# Patient Record
Sex: MALE | Race: ASIAN | NOT HISPANIC OR LATINO
[De-identification: names, ages, dates, MRNs, and addresses within clinical notes are randomized per-mention and may not be internally consistent; named-entity substitution may affect disease eponyms.]

---

## 2019-04-29 PROBLEM — Z00.00 ENCOUNTER FOR PREVENTIVE HEALTH EXAMINATION: Status: ACTIVE | Noted: 2019-04-29

## 2019-04-30 ENCOUNTER — APPOINTMENT (OUTPATIENT)
Dept: THORACIC SURGERY | Facility: CLINIC | Age: 59
End: 2019-04-30
Payer: MEDICAID

## 2019-04-30 VITALS
HEART RATE: 87 BPM | RESPIRATION RATE: 18 BRPM | TEMPERATURE: 97.5 F | BODY MASS INDEX: 26.63 KG/M2 | OXYGEN SATURATION: 97 % | DIASTOLIC BLOOD PRESSURE: 69 MMHG | HEIGHT: 64 IN | WEIGHT: 156 LBS | SYSTOLIC BLOOD PRESSURE: 119 MMHG

## 2019-04-30 DIAGNOSIS — Z87.09 PERSONAL HISTORY OF OTHER DISEASES OF THE RESPIRATORY SYSTEM: ICD-10-CM

## 2019-04-30 DIAGNOSIS — Z87.891 PERSONAL HISTORY OF NICOTINE DEPENDENCE: ICD-10-CM

## 2019-04-30 PROCEDURE — 99205 OFFICE O/P NEW HI 60 MIN: CPT

## 2019-05-01 PROBLEM — Z87.09 HISTORY OF ASTHMA: Status: RESOLVED | Noted: 2019-05-01 | Resolved: 2019-05-01

## 2019-05-01 PROBLEM — Z87.891 FORMER SMOKER: Status: ACTIVE | Noted: 2019-05-01

## 2019-05-01 PROBLEM — Z87.09 HISTORY OF CHRONIC OBSTRUCTIVE LUNG DISEASE: Status: RESOLVED | Noted: 2019-05-01 | Resolved: 2019-05-01

## 2019-05-02 NOTE — DATA REVIEWED
[FreeTextEntry1] : CT Chest on 3/11/19 (compared to 1/2018 and 3/2015):\par - a 1.7cm ill-defined RUL ggo, stable from 2018 but increased from 6mm from 2015\par - a stable 5mm LLL nodule (series 3 image 94)\par - bronchiectasis \par - subpleural atelectasis and scarring in the posterior RUL\par \par FNA of RUL nodule on 4/22/19 at [facility]/. Path revealed AdenoCA. +TTF-1 and cytokeratin; Ki-67 5%.\par \par PFTs on 4/29/19: FVC 94%, FEV1 44%, DLCO 101%.

## 2019-05-02 NOTE — ASSESSMENT
[FreeTextEntry1] : 57 y/o M, former smoker, w/ hx of asthma, COPD, lung nodule followed by Dr. Vázquez since 2015 and has found increased in size. \par \par CT Chest on 3/11/19 (compared to 1/2018 and 3/2015):\par - a 1.7cm ill-defined RUL ggo, stable from 2018 but increased from 6mm from 2015\par - a stable 5mm LLL nodule (series 3 image 94)\par - bronchiectasis \par - subpleural atelectasis and scarring in the posterior RUL\par \par FNA of RUL nodule on 4/22/19 at [facility]/. Path revealed AdenoCA. +TTF-1 and cytokeratin; Ki-67 5%.\par \par PFTs on 4/29/19: FVC 94%, FEV1 44%, DLCO 101%.\par \par I have reviewed the patient's medical records and diagnostic images at time of this office consultation and have made the following recommendation:\par 1. CT and FNA finding reviewed with pt and family. RUL biopsy proven AdenoCA, I recommended Rt VATS, robotic-assisted, RULobectomy, MLND on 6/5/19. Risks and benefits and alternatives explained to patient, all questions answered, patient agreed to proceed with surgery.\par 2. Medical clearance and PST\par 3. PET/CT and MRI of brain, ordered by Dr. Vázquez \par \par Written by Norma Elliott NP, acting as a scribe for Dr. Louie Mata. \par \par The documentation recorded by the scribe accurately reflects the service I personally performed and the decisions made by me. LOUIE MATA MD\par

## 2019-05-02 NOTE — PHYSICAL EXAM
[Oropharynx] : the oropharynx was normal [Outer Ear] : the ears and nose were normal in appearance [Neck Appearance] : the appearance of the neck was normal [Jugular Venous Distention Increased] : there was no jugular-venous distention [Neck Cervical Mass (___cm)] : no neck mass was observed [Thyroid Nodule] : there were no palpable thyroid nodules [Thyroid Diffuse Enlargement] : the thyroid was not enlarged [Heart Rate And Rhythm] : heart rate was normal and rhythm regular [Heart Sounds] : normal S1 and S2 [Heart Sounds Gallop] : no gallops [Murmurs] : no murmurs [Heart Sounds Pericardial Friction Rub] : no pericardial rub [Examination Of The Chest] : the chest was normal in appearance [Chest Visual Inspection Thoracic Asymmetry] : no chest asymmetry [Diminished Respiratory Excursion] : normal chest expansion [2+] : right 2+ [Breast Appearance] : normal in appearance [Breast Palpation Mass] : no palpable masses [Bowel Sounds] : normal bowel sounds [Abdomen Soft] : soft [Abdomen Tenderness] : non-tender [Abdomen Mass (___ Cm)] : no abdominal mass palpated [Cervical Lymph Nodes Enlarged Posterior Bilaterally] : posterior cervical [Cervical Lymph Nodes Enlarged Anterior Bilaterally] : anterior cervical [Supraclavicular Lymph Nodes Enlarged Bilaterally] : supraclavicular [No CVA Tenderness] : no ~M costovertebral angle tenderness [No Spinal Tenderness] : no spinal tenderness [Abnormal Walk] : normal gait [Nail Clubbing] : no clubbing  or cyanosis of the fingernails [Musculoskeletal - Swelling] : no joint swelling seen [Motor Tone] : muscle strength and tone were normal [Skin Color & Pigmentation] : normal skin color and pigmentation [] : no rash [Skin Turgor] : normal skin turgor [Deep Tendon Reflexes (DTR)] : deep tendon reflexes were 2+ and symmetric [Sensation] : the sensory exam was normal to light touch and pinprick [No Focal Deficits] : no focal deficits [Oriented To Time, Place, And Person] : oriented to person, place, and time [Impaired Insight] : insight and judgment were intact [Affect] : the affect was normal [General Appearance - Alert] : alert [General Appearance - In No Acute Distress] : in no acute distress [Sclera] : the sclera and conjunctiva were normal [PERRL With Normal Accommodation] : pupils were equal in size, round, and reactive to light [Extraocular Movements] : extraocular movements were intact [FreeTextEntry1] : deferred

## 2019-05-02 NOTE — CONSULT LETTER
[Dear  ___] : Dear  [unfilled], [Consult Letter:] : I had the pleasure of evaluating your patient, [unfilled]. [( Thank you for referring [unfilled] for consultation for _____ )] : Thank you for referring [unfilled] for consultation for [unfilled] [Please see my note below.] : Please see my note below. [Consult Closing:] : Thank you very much for allowing me to participate in the care of this patient.  If you have any questions, please do not hesitate to contact me. [Sincerely,] : Sincerely, [DrMarci  ___] : Dr. GARSIA [FreeTextEntry2] : Esteban Vázquez MD (Pul/Referring)\par Ashlyn Moore MD (PCP) [FreeTextEntry3] : Louie Ibarra MD, MPH \par System Director of Thoracic Surgery \par Director of Comprehensive Lung and Foregut [place] \par Professor Cardiovascular & Thoracic Surgery  \par [employer] [employer] at [facility]\par

## 2019-05-02 NOTE — HISTORY OF PRESENT ILLNESS
[FreeTextEntry1] : 59 y/o M, former smoker, w/ hx of asthma, COPD, lung nodule followed by Dr. Vázuqez since 2015 and has found increased in size. \par \par CT Chest on 3/11/19 (compared to 1/2018 and 3/2015):\par - a 1.7cm ill-defined RUL ggo, stable from 2018 but increased from 6mm from 2015\par - a stable 5mm LLL nodule (series 3 image 94)\par - bronchiectasis \par - subpleural atelectasis and scarring in the posterior RUL\par \par FNA of RUL nodule on 4/22/19 at [facility]/. Path revealed AdenoCA. +TTF-1 and cytokeratin; Ki-67 5%.\par \par PFTs on 4/29/19: FVC 94%, FEV1 44%, DLCO 101%.\par \par Patient is here today for CT Sx consultation, referred by Dr. Esteban Vázquez. Pt reports doing well, denies SOB, cough, hemoptysis, fever, chills, or CP.

## 2019-05-16 ENCOUNTER — RESULT REVIEW (OUTPATIENT)
Age: 59
End: 2019-05-16

## 2019-06-01 ENCOUNTER — OUTPATIENT (OUTPATIENT)
Dept: OUTPATIENT SERVICES | Facility: HOSPITAL | Age: 59
LOS: 1 days | End: 2019-06-01
Payer: MEDICAID

## 2019-06-01 PROCEDURE — G9001: CPT

## 2019-06-03 ENCOUNTER — OUTPATIENT (OUTPATIENT)
Dept: OUTPATIENT SERVICES | Facility: HOSPITAL | Age: 59
LOS: 1 days | End: 2019-06-03

## 2019-06-03 VITALS
HEIGHT: 64 IN | WEIGHT: 149.91 LBS | RESPIRATION RATE: 14 BRPM | OXYGEN SATURATION: 97 % | SYSTOLIC BLOOD PRESSURE: 108 MMHG | TEMPERATURE: 97 F | HEART RATE: 96 BPM | DIASTOLIC BLOOD PRESSURE: 78 MMHG

## 2019-06-03 DIAGNOSIS — C34.91 MALIGNANT NEOPLASM OF UNSPECIFIED PART OF RIGHT BRONCHUS OR LUNG: ICD-10-CM

## 2019-06-03 DIAGNOSIS — R06.83 SNORING: ICD-10-CM

## 2019-06-03 LAB
ALBUMIN SERPL ELPH-MCNC: 4.7 G/DL — SIGNIFICANT CHANGE UP (ref 3.3–5)
ALP SERPL-CCNC: 56 U/L — SIGNIFICANT CHANGE UP (ref 40–120)
ALT FLD-CCNC: 23 U/L — SIGNIFICANT CHANGE UP (ref 4–41)
ANION GAP SERPL CALC-SCNC: 14 MMO/L — SIGNIFICANT CHANGE UP (ref 7–14)
AST SERPL-CCNC: 16 U/L — SIGNIFICANT CHANGE UP (ref 4–40)
BILIRUB SERPL-MCNC: 0.6 MG/DL — SIGNIFICANT CHANGE UP (ref 0.2–1.2)
BLD GP AB SCN SERPL QL: NEGATIVE — SIGNIFICANT CHANGE UP
BUN SERPL-MCNC: 14 MG/DL — SIGNIFICANT CHANGE UP (ref 7–23)
CALCIUM SERPL-MCNC: 9.7 MG/DL — SIGNIFICANT CHANGE UP (ref 8.4–10.5)
CHLORIDE SERPL-SCNC: 102 MMOL/L — SIGNIFICANT CHANGE UP (ref 98–107)
CO2 SERPL-SCNC: 25 MMOL/L — SIGNIFICANT CHANGE UP (ref 22–31)
CREAT SERPL-MCNC: 0.84 MG/DL — SIGNIFICANT CHANGE UP (ref 0.5–1.3)
GLUCOSE SERPL-MCNC: 77 MG/DL — SIGNIFICANT CHANGE UP (ref 70–99)
HCT VFR BLD CALC: 47.1 % — SIGNIFICANT CHANGE UP (ref 39–50)
HGB BLD-MCNC: 15.3 G/DL — SIGNIFICANT CHANGE UP (ref 13–17)
MCHC RBC-ENTMCNC: 28.6 PG — SIGNIFICANT CHANGE UP (ref 27–34)
MCHC RBC-ENTMCNC: 32.5 % — SIGNIFICANT CHANGE UP (ref 32–36)
MCV RBC AUTO: 88 FL — SIGNIFICANT CHANGE UP (ref 80–100)
NRBC # FLD: 0 K/UL — SIGNIFICANT CHANGE UP (ref 0–0)
PLATELET # BLD AUTO: 291 K/UL — SIGNIFICANT CHANGE UP (ref 150–400)
PMV BLD: 10.5 FL — SIGNIFICANT CHANGE UP (ref 7–13)
POTASSIUM SERPL-MCNC: 3.6 MMOL/L — SIGNIFICANT CHANGE UP (ref 3.5–5.3)
POTASSIUM SERPL-SCNC: 3.6 MMOL/L — SIGNIFICANT CHANGE UP (ref 3.5–5.3)
PROT SERPL-MCNC: 7.6 G/DL — SIGNIFICANT CHANGE UP (ref 6–8.3)
RBC # BLD: 5.35 M/UL — SIGNIFICANT CHANGE UP (ref 4.2–5.8)
RBC # FLD: 12 % — SIGNIFICANT CHANGE UP (ref 10.3–14.5)
RH IG SCN BLD-IMP: POSITIVE — SIGNIFICANT CHANGE UP
SODIUM SERPL-SCNC: 141 MMOL/L — SIGNIFICANT CHANGE UP (ref 135–145)
WBC # BLD: 9.06 K/UL — SIGNIFICANT CHANGE UP (ref 3.8–10.5)
WBC # FLD AUTO: 9.06 K/UL — SIGNIFICANT CHANGE UP (ref 3.8–10.5)

## 2019-06-03 RX ORDER — SODIUM CHLORIDE 9 MG/ML
1000 INJECTION, SOLUTION INTRAVENOUS
Refills: 0 | Status: DISCONTINUED | OUTPATIENT
Start: 2019-06-10 | End: 2019-06-13

## 2019-06-03 NOTE — H&P PST ADULT - NEGATIVE OPHTHALMOLOGIC SYMPTOMS
no pain L/no irritation L/no discharge L/no pain R/no discharge R/no diplopia/no irritation R/no photophobia/no blurred vision L

## 2019-06-03 NOTE — H&P PST ADULT - HISTORY OF PRESENT ILLNESS
Patient is a 58 year old male with a history of Lung nodule followed by Dr. Vázquez since 2015. Patient is s/p CT of the chest on 3/11/2019 - with abnormal findings, and has found increased in size to the lung nodule. Patient was referred to Dr. Ibarra for an evaluation.  Pre op diagnosis; Malignant neoplasm of unspecified part of right bronchus or lung. Patient is scheduled for Right video assisted thoracoscopy, robotic assisted right upper lobectomy, mediastinal lymph node dissection scheduled on 6/10/2019.

## 2019-06-03 NOTE — H&P PST ADULT - MUSCULOSKELETAL
detailed exam ROM intact/no joint warmth/no joint erythema/normal strength/no joint swelling/no calf tenderness details…

## 2019-06-03 NOTE — H&P PST ADULT - NEGATIVE NEUROLOGICAL SYMPTOMS
no weakness/no generalized seizures/no vertigo/no difficulty walking/no tremors/no focal seizures/no headache/no paresthesias/no transient paralysis

## 2019-06-03 NOTE — H&P PST ADULT - NEGATIVE GENERAL GENITOURINARY SYMPTOMS
no flank pain L/no bladder infections/no hematuria/no dysuria/no urinary hesitancy/normal urinary frequency/no flank pain R/no urine discoloration/no incontinence

## 2019-06-03 NOTE — H&P PST ADULT - NSANTHOSAYNRD_GEN_A_CORE
No. BRIA screening performed.  STOP BANG Legend: 0-2 = LOW Risk; 3-4 = INTERMEDIATE Risk; 5-8 = HIGH Risk

## 2019-06-03 NOTE — H&P PST ADULT - RS GEN PE MLT RESP DETAILS PC
no rhonchi/respirations non-labored/airway patent/clear to auscultation bilaterally/no rales/no wheezes/breath sounds equal/good air movement

## 2019-06-03 NOTE — H&P PST ADULT - NEGATIVE ENMT SYMPTOMS
no nasal congestion/no hearing difficulty/no ear pain/no dry mouth/no vertigo/no sinus symptoms/no nasal obstruction/no post-nasal discharge/no tinnitus/no nose bleeds/no recurrent cold sores/no throat pain/no dysphagia

## 2019-06-03 NOTE — H&P PST ADULT - NEGATIVE ALLERGY TYPES
no reactions to insect bites/no indoor environmental allergies/no reactions to animals/no outdoor environmental allergies/no reactions to medicines/no reactions to food

## 2019-06-03 NOTE — H&P PST ADULT - NEGATIVE MUSCULOSKELETAL SYMPTOMS
no arthritis/no muscle cramps/no stiffness/no neck pain/no muscle weakness/no joint swelling/no arm pain L/no arm pain R/no leg pain R/no back pain/no leg pain L

## 2019-06-03 NOTE — H&P PST ADULT - NEGATIVE GASTROINTESTINAL SYMPTOMS
no change in bowel habits/no constipation/no abdominal pain/no vomiting/no diarrhea/no melena/no nausea

## 2019-06-03 NOTE — H&P PST ADULT - NSICDXPROBLEM_GEN_ALL_CORE_FT
PROBLEM DIAGNOSES  Problem: Malignant neoplasm of unspecified part of right bronchus or lung  Assessment and Plan:  Patient is scheduled for Right video assisted thoracoscopy, robotic assisted right upper lobectomy, mediastinal lymph node dissection scheduled on 6/10/2019.   Preop instructions, pepcid, surgical scrub provided. Pt stated understanding. Teach back method utilized.   Pending medical evaluation per surgeon and PST- Obtain for PST.   History of Asthma - Denies any medications, denies any recent hospitalization.       Problem: Snoring  Assessment and Plan: BRIA precautions, OR booking notified.

## 2019-06-03 NOTE — H&P PST ADULT - NSICDXPASTMEDICALHX_GEN_ALL_CORE_FT
PAST MEDICAL HISTORY:  Asthma Denies any recent hospitalization for Asthma    Fatty liver     Malignant neoplasm of unspecified part of right bronchus or lung

## 2019-06-03 NOTE — H&P PST ADULT - ASSESSMENT
Pre op diagnosis; Malignant neoplasm of unspecified part of right bronchus or lung. Patient is scheduled for Right video assisted thoracoscopy, robotic assisted right upper lobectomy, mediastinal lymph node dissection scheduled on 6/10/2019.

## 2019-06-07 RX ORDER — HEPARIN SODIUM 5000 [USP'U]/ML
5000 INJECTION INTRAVENOUS; SUBCUTANEOUS ONCE
Refills: 0 | Status: COMPLETED | OUTPATIENT
Start: 2019-06-10 | End: 2019-06-10

## 2019-06-10 ENCOUNTER — INPATIENT (INPATIENT)
Facility: HOSPITAL | Age: 59
LOS: 2 days | Discharge: HOME | End: 2019-06-13
Attending: THORACIC SURGERY (CARDIOTHORACIC VASCULAR SURGERY) | Admitting: THORACIC SURGERY (CARDIOTHORACIC VASCULAR SURGERY)
Payer: MEDICAID

## 2019-06-10 ENCOUNTER — RESULT REVIEW (OUTPATIENT)
Age: 59
End: 2019-06-10

## 2019-06-10 ENCOUNTER — APPOINTMENT (OUTPATIENT)
Dept: THORACIC SURGERY | Facility: HOSPITAL | Age: 59
End: 2019-06-10

## 2019-06-10 VITALS
RESPIRATION RATE: 20 BRPM | WEIGHT: 149.91 LBS | DIASTOLIC BLOOD PRESSURE: 71 MMHG | TEMPERATURE: 98 F | OXYGEN SATURATION: 95 % | SYSTOLIC BLOOD PRESSURE: 109 MMHG | HEART RATE: 84 BPM | HEIGHT: 64 IN

## 2019-06-10 DIAGNOSIS — C34.91 MALIGNANT NEOPLASM OF UNSPECIFIED PART OF RIGHT BRONCHUS OR LUNG: ICD-10-CM

## 2019-06-10 LAB — RH IG SCN BLD-IMP: POSITIVE — SIGNIFICANT CHANGE UP

## 2019-06-10 PROCEDURE — 32674 THORACOSCOPY LYMPH NODE EXC: CPT | Mod: AS

## 2019-06-10 PROCEDURE — 71045 X-RAY EXAM CHEST 1 VIEW: CPT | Mod: 26

## 2019-06-10 PROCEDURE — S2900 ROBOTIC SURGICAL SYSTEM: CPT | Mod: NC

## 2019-06-10 PROCEDURE — 88307 TISSUE EXAM BY PATHOLOGIST: CPT | Mod: 26

## 2019-06-10 PROCEDURE — 32674 THORACOSCOPY LYMPH NODE EXC: CPT

## 2019-06-10 PROCEDURE — 32663 THORACOSCOPY W/LOBECTOMY: CPT | Mod: 22

## 2019-06-10 PROCEDURE — 88305 TISSUE EXAM BY PATHOLOGIST: CPT | Mod: 26

## 2019-06-10 PROCEDURE — 99233 SBSQ HOSP IP/OBS HIGH 50: CPT

## 2019-06-10 PROCEDURE — 32663 THORACOSCOPY W/LOBECTOMY: CPT | Mod: AS

## 2019-06-10 RX ORDER — HYDROMORPHONE HYDROCHLORIDE 2 MG/ML
0.5 INJECTION INTRAMUSCULAR; INTRAVENOUS; SUBCUTANEOUS
Refills: 0 | Status: DISCONTINUED | OUTPATIENT
Start: 2019-06-10 | End: 2019-06-12

## 2019-06-10 RX ORDER — DEXMEDETOMIDINE HYDROCHLORIDE IN 0.9% SODIUM CHLORIDE 4 UG/ML
0.4 INJECTION INTRAVENOUS
Qty: 200 | Refills: 0 | Status: DISCONTINUED | OUTPATIENT
Start: 2019-06-10 | End: 2019-06-11

## 2019-06-10 RX ORDER — ACETAMINOPHEN 500 MG
1000 TABLET ORAL ONCE
Refills: 0 | Status: COMPLETED | OUTPATIENT
Start: 2019-06-10 | End: 2019-06-11

## 2019-06-10 RX ORDER — NALOXONE HYDROCHLORIDE 4 MG/.1ML
0.1 SPRAY NASAL
Refills: 0 | Status: DISCONTINUED | OUTPATIENT
Start: 2019-06-10 | End: 2019-06-12

## 2019-06-10 RX ORDER — ONDANSETRON 8 MG/1
4 TABLET, FILM COATED ORAL EVERY 6 HOURS
Refills: 0 | Status: DISCONTINUED | OUTPATIENT
Start: 2019-06-10 | End: 2019-06-12

## 2019-06-10 RX ORDER — SENNA PLUS 8.6 MG/1
2 TABLET ORAL AT BEDTIME
Refills: 0 | Status: DISCONTINUED | OUTPATIENT
Start: 2019-06-10 | End: 2019-06-13

## 2019-06-10 RX ORDER — HYDROMORPHONE HYDROCHLORIDE 2 MG/ML
30 INJECTION INTRAMUSCULAR; INTRAVENOUS; SUBCUTANEOUS
Refills: 0 | Status: DISCONTINUED | OUTPATIENT
Start: 2019-06-10 | End: 2019-06-12

## 2019-06-10 RX ORDER — DOCUSATE SODIUM 100 MG
100 CAPSULE ORAL THREE TIMES A DAY
Refills: 0 | Status: DISCONTINUED | OUTPATIENT
Start: 2019-06-10 | End: 2019-06-13

## 2019-06-10 RX ORDER — DIPHENHYDRAMINE HCL 50 MG
25 CAPSULE ORAL EVERY 4 HOURS
Refills: 0 | Status: DISCONTINUED | OUTPATIENT
Start: 2019-06-10 | End: 2019-06-12

## 2019-06-10 RX ADMIN — SODIUM CHLORIDE 30 MILLILITER(S): 9 INJECTION, SOLUTION INTRAVENOUS at 19:14

## 2019-06-10 RX ADMIN — HYDROMORPHONE HYDROCHLORIDE 30 MILLILITER(S): 2 INJECTION INTRAMUSCULAR; INTRAVENOUS; SUBCUTANEOUS at 19:15

## 2019-06-10 RX ADMIN — DEXMEDETOMIDINE HYDROCHLORIDE IN 0.9% SODIUM CHLORIDE 6.8 MICROGRAM(S)/KG/HR: 4 INJECTION INTRAVENOUS at 19:16

## 2019-06-10 RX ADMIN — HEPARIN SODIUM 5000 UNIT(S): 5000 INJECTION INTRAVENOUS; SUBCUTANEOUS at 13:43

## 2019-06-10 NOTE — BRIEF OPERATIVE NOTE - NSICDXBRIEFPROCEDURE_GEN_ALL_CORE_FT
PROCEDURES:  Robot-assisted thoracoscopic lobectomy of lung 10-Leo-2019 18:01:31 RULobectomy Cruz Slade

## 2019-06-10 NOTE — BRIEF OPERATIVE NOTE - ASSISTANT(S)
Nickolas Diaz PGY-7, Kun Slade PAC Keppra held for now. Will get EEG to see if there are any ongoing seizures. If there are any clear seizures then will restart.

## 2019-06-10 NOTE — ASU PATIENT PROFILE, ADULT - PMH
Asthma  Denies any recent hospitalization for Asthma  Fatty liver    Malignant neoplasm of unspecified part of right bronchus or lung

## 2019-06-11 DIAGNOSIS — Z71.89 OTHER SPECIFIED COUNSELING: ICD-10-CM

## 2019-06-11 LAB
ALBUMIN SERPL ELPH-MCNC: 4.3 G/DL — SIGNIFICANT CHANGE UP (ref 3.3–5)
ALP SERPL-CCNC: 48 U/L — SIGNIFICANT CHANGE UP (ref 40–120)
ALT FLD-CCNC: 30 U/L — SIGNIFICANT CHANGE UP (ref 4–41)
ANION GAP SERPL CALC-SCNC: 14 MMO/L — SIGNIFICANT CHANGE UP (ref 7–14)
ANION GAP SERPL CALC-SCNC: 14 MMO/L — SIGNIFICANT CHANGE UP (ref 7–14)
AST SERPL-CCNC: 25 U/L — SIGNIFICANT CHANGE UP (ref 4–40)
BASOPHILS # BLD AUTO: 0.05 K/UL — SIGNIFICANT CHANGE UP (ref 0–0.2)
BASOPHILS NFR BLD AUTO: 0.3 % — SIGNIFICANT CHANGE UP (ref 0–2)
BILIRUB SERPL-MCNC: 1.1 MG/DL — SIGNIFICANT CHANGE UP (ref 0.2–1.2)
BUN SERPL-MCNC: 24 MG/DL — HIGH (ref 7–23)
BUN SERPL-MCNC: 24 MG/DL — HIGH (ref 7–23)
CALCIUM SERPL-MCNC: 8.7 MG/DL — SIGNIFICANT CHANGE UP (ref 8.4–10.5)
CALCIUM SERPL-MCNC: 8.7 MG/DL — SIGNIFICANT CHANGE UP (ref 8.4–10.5)
CHLORIDE SERPL-SCNC: 104 MMOL/L — SIGNIFICANT CHANGE UP (ref 98–107)
CHLORIDE SERPL-SCNC: 104 MMOL/L — SIGNIFICANT CHANGE UP (ref 98–107)
CO2 SERPL-SCNC: 21 MMOL/L — LOW (ref 22–31)
CO2 SERPL-SCNC: 21 MMOL/L — LOW (ref 22–31)
CREAT SERPL-MCNC: 0.89 MG/DL — SIGNIFICANT CHANGE UP (ref 0.5–1.3)
CREAT SERPL-MCNC: 0.89 MG/DL — SIGNIFICANT CHANGE UP (ref 0.5–1.3)
EOSINOPHIL # BLD AUTO: 0.07 K/UL — SIGNIFICANT CHANGE UP (ref 0–0.5)
EOSINOPHIL NFR BLD AUTO: 0.4 % — SIGNIFICANT CHANGE UP (ref 0–6)
GLUCOSE SERPL-MCNC: 139 MG/DL — HIGH (ref 70–99)
GLUCOSE SERPL-MCNC: 139 MG/DL — HIGH (ref 70–99)
HCT VFR BLD CALC: 47.4 % — SIGNIFICANT CHANGE UP (ref 39–50)
HCT VFR BLD CALC: 47.4 % — SIGNIFICANT CHANGE UP (ref 39–50)
HGB BLD-MCNC: 15.6 G/DL — SIGNIFICANT CHANGE UP (ref 13–17)
HGB BLD-MCNC: 15.6 G/DL — SIGNIFICANT CHANGE UP (ref 13–17)
IMM GRANULOCYTES NFR BLD AUTO: 0.8 % — SIGNIFICANT CHANGE UP (ref 0–1.5)
LYMPHOCYTES # BLD AUTO: 1.09 K/UL — SIGNIFICANT CHANGE UP (ref 1–3.3)
LYMPHOCYTES # BLD AUTO: 5.5 % — LOW (ref 13–44)
MCHC RBC-ENTMCNC: 29.2 PG — SIGNIFICANT CHANGE UP (ref 27–34)
MCHC RBC-ENTMCNC: 29.2 PG — SIGNIFICANT CHANGE UP (ref 27–34)
MCHC RBC-ENTMCNC: 32.9 % — SIGNIFICANT CHANGE UP (ref 32–36)
MCHC RBC-ENTMCNC: 32.9 % — SIGNIFICANT CHANGE UP (ref 32–36)
MCV RBC AUTO: 88.6 FL — SIGNIFICANT CHANGE UP (ref 80–100)
MCV RBC AUTO: 88.6 FL — SIGNIFICANT CHANGE UP (ref 80–100)
MONOCYTES # BLD AUTO: 0.95 K/UL — HIGH (ref 0–0.9)
MONOCYTES NFR BLD AUTO: 4.8 % — SIGNIFICANT CHANGE UP (ref 2–14)
NEUTROPHILS # BLD AUTO: 17.57 K/UL — HIGH (ref 1.8–7.4)
NEUTROPHILS NFR BLD AUTO: 88.2 % — HIGH (ref 43–77)
NRBC # FLD: 0 K/UL — SIGNIFICANT CHANGE UP (ref 0–0)
NRBC # FLD: 0 K/UL — SIGNIFICANT CHANGE UP (ref 0–0)
PLATELET # BLD AUTO: 270 K/UL — SIGNIFICANT CHANGE UP (ref 150–400)
PLATELET # BLD AUTO: 270 K/UL — SIGNIFICANT CHANGE UP (ref 150–400)
PMV BLD: 10.2 FL — SIGNIFICANT CHANGE UP (ref 7–13)
PMV BLD: 10.2 FL — SIGNIFICANT CHANGE UP (ref 7–13)
POTASSIUM SERPL-MCNC: 4.6 MMOL/L — SIGNIFICANT CHANGE UP (ref 3.5–5.3)
POTASSIUM SERPL-MCNC: 4.6 MMOL/L — SIGNIFICANT CHANGE UP (ref 3.5–5.3)
POTASSIUM SERPL-SCNC: 4.6 MMOL/L — SIGNIFICANT CHANGE UP (ref 3.5–5.3)
POTASSIUM SERPL-SCNC: 4.6 MMOL/L — SIGNIFICANT CHANGE UP (ref 3.5–5.3)
PROT SERPL-MCNC: 7 G/DL — SIGNIFICANT CHANGE UP (ref 6–8.3)
RBC # BLD: 5.35 M/UL — SIGNIFICANT CHANGE UP (ref 4.2–5.8)
RBC # BLD: 5.35 M/UL — SIGNIFICANT CHANGE UP (ref 4.2–5.8)
RBC # FLD: 11.9 % — SIGNIFICANT CHANGE UP (ref 10.3–14.5)
RBC # FLD: 11.9 % — SIGNIFICANT CHANGE UP (ref 10.3–14.5)
SODIUM SERPL-SCNC: 139 MMOL/L — SIGNIFICANT CHANGE UP (ref 135–145)
SODIUM SERPL-SCNC: 139 MMOL/L — SIGNIFICANT CHANGE UP (ref 135–145)
WBC # BLD: 19.89 K/UL — HIGH (ref 3.8–10.5)
WBC # BLD: 19.89 K/UL — HIGH (ref 3.8–10.5)
WBC # FLD AUTO: 19.89 K/UL — HIGH (ref 3.8–10.5)
WBC # FLD AUTO: 19.89 K/UL — HIGH (ref 3.8–10.5)

## 2019-06-11 PROCEDURE — 99233 SBSQ HOSP IP/OBS HIGH 50: CPT

## 2019-06-11 PROCEDURE — 71045 X-RAY EXAM CHEST 1 VIEW: CPT | Mod: 26

## 2019-06-11 PROCEDURE — 99292 CRITICAL CARE ADDL 30 MIN: CPT

## 2019-06-11 PROCEDURE — 99291 CRITICAL CARE FIRST HOUR: CPT

## 2019-06-11 RX ORDER — DILTIAZEM HCL 120 MG
5 CAPSULE, EXT RELEASE 24 HR ORAL
Qty: 125 | Refills: 0 | Status: DISCONTINUED | OUTPATIENT
Start: 2019-06-11 | End: 2019-06-13

## 2019-06-11 RX ORDER — DILTIAZEM HCL 120 MG
5 CAPSULE, EXT RELEASE 24 HR ORAL ONCE
Refills: 0 | Status: COMPLETED | OUTPATIENT
Start: 2019-06-11 | End: 2019-06-11

## 2019-06-11 RX ORDER — SODIUM CHLORIDE 9 MG/ML
250 INJECTION, SOLUTION INTRAVENOUS ONCE
Refills: 0 | Status: COMPLETED | OUTPATIENT
Start: 2019-06-11 | End: 2019-06-11

## 2019-06-11 RX ORDER — DILTIAZEM HCL 120 MG
30 CAPSULE, EXT RELEASE 24 HR ORAL EVERY 6 HOURS
Refills: 0 | Status: DISCONTINUED | OUTPATIENT
Start: 2019-06-11 | End: 2019-06-12

## 2019-06-11 RX ORDER — DILTIAZEM HCL 120 MG
10 CAPSULE, EXT RELEASE 24 HR ORAL ONCE
Refills: 0 | Status: COMPLETED | OUTPATIENT
Start: 2019-06-11 | End: 2019-06-11

## 2019-06-11 RX ORDER — PHENYLEPHRINE HYDROCHLORIDE 10 MG/ML
0.3 INJECTION INTRAVENOUS
Qty: 40 | Refills: 0 | Status: DISCONTINUED | OUTPATIENT
Start: 2019-06-11 | End: 2019-06-13

## 2019-06-11 RX ADMIN — SENNA PLUS 2 TABLET(S): 8.6 TABLET ORAL at 23:18

## 2019-06-11 RX ADMIN — SODIUM CHLORIDE 30 MILLILITER(S): 9 INJECTION, SOLUTION INTRAVENOUS at 07:11

## 2019-06-11 RX ADMIN — HYDROMORPHONE HYDROCHLORIDE 30 MILLILITER(S): 2 INJECTION INTRAMUSCULAR; INTRAVENOUS; SUBCUTANEOUS at 07:10

## 2019-06-11 RX ADMIN — Medication 1000 MILLIGRAM(S): at 09:30

## 2019-06-11 RX ADMIN — SODIUM CHLORIDE 1000 MILLILITER(S): 9 INJECTION, SOLUTION INTRAVENOUS at 05:11

## 2019-06-11 RX ADMIN — SODIUM CHLORIDE 30 MILLILITER(S): 9 INJECTION, SOLUTION INTRAVENOUS at 18:57

## 2019-06-11 RX ADMIN — Medication 5 MG/HR: at 12:39

## 2019-06-11 RX ADMIN — Medication 5 MILLIGRAM(S): at 12:10

## 2019-06-11 RX ADMIN — HYDROMORPHONE HYDROCHLORIDE 30 MILLILITER(S): 2 INJECTION INTRAMUSCULAR; INTRAVENOUS; SUBCUTANEOUS at 18:56

## 2019-06-11 RX ADMIN — Medication 30 MILLIGRAM(S): at 23:22

## 2019-06-11 RX ADMIN — Medication 400 MILLIGRAM(S): at 09:15

## 2019-06-11 RX ADMIN — Medication 100 MILLIGRAM(S): at 23:18

## 2019-06-11 RX ADMIN — PHENYLEPHRINE HYDROCHLORIDE 7.65 MICROGRAM(S)/KG/MIN: 10 INJECTION INTRAVENOUS at 13:02

## 2019-06-11 RX ADMIN — Medication 100 MILLIGRAM(S): at 05:20

## 2019-06-11 RX ADMIN — Medication 5 MG/HR: at 18:57

## 2019-06-11 RX ADMIN — PHENYLEPHRINE HYDROCHLORIDE 7.65 MICROGRAM(S)/KG/MIN: 10 INJECTION INTRAVENOUS at 18:57

## 2019-06-11 RX ADMIN — Medication 30 MILLIGRAM(S): at 16:27

## 2019-06-11 RX ADMIN — Medication 10 MILLIGRAM(S): at 12:25

## 2019-06-11 NOTE — PROVIDER CONTACT NOTE (OTHER) - ACTION/TREATMENT ORDERED:
CARDIZEM 5MG IVP X 1, CARDIZEM 10MG IVP X 1(BECAUSE HR DID NOT IMPROVE)--> START CARDIZEM GTT AS PER ORDER, TITRATE AS PER ICU PROTOCOL; START MERLE IF BP CANNOT TOLERATE CARDIZEM GTT; WILL MONITOR

## 2019-06-12 LAB
ANION GAP SERPL CALC-SCNC: 15 MMO/L — HIGH (ref 7–14)
ANION GAP SERPL CALC-SCNC: 18 MMO/L — HIGH (ref 7–14)
BUN SERPL-MCNC: 13 MG/DL — SIGNIFICANT CHANGE UP (ref 7–23)
BUN SERPL-MCNC: 17 MG/DL — SIGNIFICANT CHANGE UP (ref 7–23)
CALCIUM SERPL-MCNC: 8.3 MG/DL — LOW (ref 8.4–10.5)
CALCIUM SERPL-MCNC: 8.9 MG/DL — SIGNIFICANT CHANGE UP (ref 8.4–10.5)
CHLORIDE SERPL-SCNC: 96 MMOL/L — LOW (ref 98–107)
CHLORIDE SERPL-SCNC: 99 MMOL/L — SIGNIFICANT CHANGE UP (ref 98–107)
CO2 SERPL-SCNC: 20 MMOL/L — LOW (ref 22–31)
CO2 SERPL-SCNC: 23 MMOL/L — SIGNIFICANT CHANGE UP (ref 22–31)
CREAT SERPL-MCNC: 0.7 MG/DL — SIGNIFICANT CHANGE UP (ref 0.5–1.3)
CREAT SERPL-MCNC: 0.76 MG/DL — SIGNIFICANT CHANGE UP (ref 0.5–1.3)
GLUCOSE SERPL-MCNC: 135 MG/DL — HIGH (ref 70–99)
GLUCOSE SERPL-MCNC: 137 MG/DL — HIGH (ref 70–99)
HCT VFR BLD CALC: 47 % — SIGNIFICANT CHANGE UP (ref 39–50)
HGB BLD-MCNC: 15.7 G/DL — SIGNIFICANT CHANGE UP (ref 13–17)
MAGNESIUM SERPL-MCNC: 2 MG/DL — SIGNIFICANT CHANGE UP (ref 1.6–2.6)
MAGNESIUM SERPL-MCNC: 2.1 MG/DL — SIGNIFICANT CHANGE UP (ref 1.6–2.6)
MCHC RBC-ENTMCNC: 29.3 PG — SIGNIFICANT CHANGE UP (ref 27–34)
MCHC RBC-ENTMCNC: 33.4 % — SIGNIFICANT CHANGE UP (ref 32–36)
MCV RBC AUTO: 87.9 FL — SIGNIFICANT CHANGE UP (ref 80–100)
NRBC # FLD: 0 K/UL — SIGNIFICANT CHANGE UP (ref 0–0)
PHOSPHATE SERPL-MCNC: 2.2 MG/DL — LOW (ref 2.5–4.5)
PHOSPHATE SERPL-MCNC: 2.7 MG/DL — SIGNIFICANT CHANGE UP (ref 2.5–4.5)
PLATELET # BLD AUTO: 295 K/UL — SIGNIFICANT CHANGE UP (ref 150–400)
PMV BLD: 9.7 FL — SIGNIFICANT CHANGE UP (ref 7–13)
POTASSIUM SERPL-MCNC: 3.8 MMOL/L — SIGNIFICANT CHANGE UP (ref 3.5–5.3)
POTASSIUM SERPL-MCNC: 4.2 MMOL/L — SIGNIFICANT CHANGE UP (ref 3.5–5.3)
POTASSIUM SERPL-SCNC: 3.8 MMOL/L — SIGNIFICANT CHANGE UP (ref 3.5–5.3)
POTASSIUM SERPL-SCNC: 4.2 MMOL/L — SIGNIFICANT CHANGE UP (ref 3.5–5.3)
RBC # BLD: 5.35 M/UL — SIGNIFICANT CHANGE UP (ref 4.2–5.8)
RBC # FLD: 12 % — SIGNIFICANT CHANGE UP (ref 10.3–14.5)
SODIUM SERPL-SCNC: 134 MMOL/L — LOW (ref 135–145)
SODIUM SERPL-SCNC: 137 MMOL/L — SIGNIFICANT CHANGE UP (ref 135–145)
WBC # BLD: 18.79 K/UL — HIGH (ref 3.8–10.5)
WBC # FLD AUTO: 18.79 K/UL — HIGH (ref 3.8–10.5)

## 2019-06-12 PROCEDURE — 99292 CRITICAL CARE ADDL 30 MIN: CPT

## 2019-06-12 PROCEDURE — 71045 X-RAY EXAM CHEST 1 VIEW: CPT | Mod: 26

## 2019-06-12 PROCEDURE — 99291 CRITICAL CARE FIRST HOUR: CPT

## 2019-06-12 RX ORDER — ACETAMINOPHEN 500 MG
1000 TABLET ORAL ONCE
Refills: 0 | Status: COMPLETED | OUTPATIENT
Start: 2019-06-12 | End: 2019-06-12

## 2019-06-12 RX ORDER — HYDROMORPHONE HYDROCHLORIDE 2 MG/ML
0.5 INJECTION INTRAMUSCULAR; INTRAVENOUS; SUBCUTANEOUS
Refills: 0 | Status: DISCONTINUED | OUTPATIENT
Start: 2019-06-12 | End: 2019-06-13

## 2019-06-12 RX ORDER — SODIUM CHLORIDE 9 MG/ML
1000 INJECTION INTRAMUSCULAR; INTRAVENOUS; SUBCUTANEOUS ONCE
Refills: 0 | Status: COMPLETED | OUTPATIENT
Start: 2019-06-12 | End: 2019-06-12

## 2019-06-12 RX ORDER — OXYCODONE HYDROCHLORIDE 5 MG/1
10 TABLET ORAL
Refills: 0 | Status: DISCONTINUED | OUTPATIENT
Start: 2019-06-12 | End: 2019-06-13

## 2019-06-12 RX ORDER — DILTIAZEM HCL 120 MG
60 CAPSULE, EXT RELEASE 24 HR ORAL EVERY 6 HOURS
Refills: 0 | Status: DISCONTINUED | OUTPATIENT
Start: 2019-06-12 | End: 2019-06-13

## 2019-06-12 RX ORDER — OXYCODONE HYDROCHLORIDE 5 MG/1
5 TABLET ORAL
Refills: 0 | Status: DISCONTINUED | OUTPATIENT
Start: 2019-06-12 | End: 2019-06-13

## 2019-06-12 RX ORDER — ACETAMINOPHEN 500 MG
650 TABLET ORAL EVERY 6 HOURS
Refills: 0 | Status: DISCONTINUED | OUTPATIENT
Start: 2019-06-12 | End: 2019-06-13

## 2019-06-12 RX ADMIN — OXYCODONE HYDROCHLORIDE 5 MILLIGRAM(S): 5 TABLET ORAL at 11:30

## 2019-06-12 RX ADMIN — Medication 1000 MILLIGRAM(S): at 11:30

## 2019-06-12 RX ADMIN — OXYCODONE HYDROCHLORIDE 5 MILLIGRAM(S): 5 TABLET ORAL at 12:00

## 2019-06-12 RX ADMIN — SODIUM CHLORIDE 30 MILLILITER(S): 9 INJECTION, SOLUTION INTRAVENOUS at 07:16

## 2019-06-12 RX ADMIN — Medication 60 MILLIGRAM(S): at 07:25

## 2019-06-12 RX ADMIN — Medication 100 MILLIGRAM(S): at 14:49

## 2019-06-12 RX ADMIN — SODIUM CHLORIDE 33.33 MILLILITER(S): 9 INJECTION INTRAMUSCULAR; INTRAVENOUS; SUBCUTANEOUS at 06:56

## 2019-06-12 RX ADMIN — Medication 100 MILLIGRAM(S): at 05:32

## 2019-06-12 RX ADMIN — Medication 60 MILLIGRAM(S): at 14:50

## 2019-06-12 RX ADMIN — Medication 60 MILLIGRAM(S): at 21:25

## 2019-06-12 RX ADMIN — OXYCODONE HYDROCHLORIDE 5 MILLIGRAM(S): 5 TABLET ORAL at 21:30

## 2019-06-12 RX ADMIN — Medication 400 MILLIGRAM(S): at 11:00

## 2019-06-12 RX ADMIN — OXYCODONE HYDROCHLORIDE 5 MILLIGRAM(S): 5 TABLET ORAL at 22:00

## 2019-06-12 RX ADMIN — HYDROMORPHONE HYDROCHLORIDE 30 MILLILITER(S): 2 INJECTION INTRAMUSCULAR; INTRAVENOUS; SUBCUTANEOUS at 07:17

## 2019-06-13 ENCOUNTER — TRANSCRIPTION ENCOUNTER (OUTPATIENT)
Age: 59
End: 2019-06-13

## 2019-06-13 VITALS
OXYGEN SATURATION: 97 % | RESPIRATION RATE: 20 BRPM | TEMPERATURE: 98 F | HEART RATE: 92 BPM | SYSTOLIC BLOOD PRESSURE: 110 MMHG | DIASTOLIC BLOOD PRESSURE: 77 MMHG

## 2019-06-13 LAB
ANION GAP SERPL CALC-SCNC: 13 MMO/L — SIGNIFICANT CHANGE UP (ref 7–14)
BUN SERPL-MCNC: 9 MG/DL — SIGNIFICANT CHANGE UP (ref 7–23)
CALCIUM SERPL-MCNC: 8.6 MG/DL — SIGNIFICANT CHANGE UP (ref 8.4–10.5)
CHLORIDE SERPL-SCNC: 103 MMOL/L — SIGNIFICANT CHANGE UP (ref 98–107)
CO2 SERPL-SCNC: 24 MMOL/L — SIGNIFICANT CHANGE UP (ref 22–31)
CREAT SERPL-MCNC: 0.71 MG/DL — SIGNIFICANT CHANGE UP (ref 0.5–1.3)
GLUCOSE SERPL-MCNC: 114 MG/DL — HIGH (ref 70–99)
HCT VFR BLD CALC: 41.3 % — SIGNIFICANT CHANGE UP (ref 39–50)
HGB BLD-MCNC: 13.8 G/DL — SIGNIFICANT CHANGE UP (ref 13–17)
MAGNESIUM SERPL-MCNC: 1.9 MG/DL — SIGNIFICANT CHANGE UP (ref 1.6–2.6)
MCHC RBC-ENTMCNC: 29.7 PG — SIGNIFICANT CHANGE UP (ref 27–34)
MCHC RBC-ENTMCNC: 33.4 % — SIGNIFICANT CHANGE UP (ref 32–36)
MCV RBC AUTO: 88.8 FL — SIGNIFICANT CHANGE UP (ref 80–100)
NRBC # FLD: 0 K/UL — SIGNIFICANT CHANGE UP (ref 0–0)
PHOSPHATE SERPL-MCNC: 2.6 MG/DL — SIGNIFICANT CHANGE UP (ref 2.5–4.5)
PLATELET # BLD AUTO: 237 K/UL — SIGNIFICANT CHANGE UP (ref 150–400)
PMV BLD: 10.1 FL — SIGNIFICANT CHANGE UP (ref 7–13)
POTASSIUM SERPL-MCNC: 3.7 MMOL/L — SIGNIFICANT CHANGE UP (ref 3.5–5.3)
POTASSIUM SERPL-SCNC: 3.7 MMOL/L — SIGNIFICANT CHANGE UP (ref 3.5–5.3)
RBC # BLD: 4.65 M/UL — SIGNIFICANT CHANGE UP (ref 4.2–5.8)
RBC # FLD: 12 % — SIGNIFICANT CHANGE UP (ref 10.3–14.5)
SODIUM SERPL-SCNC: 140 MMOL/L — SIGNIFICANT CHANGE UP (ref 135–145)
WBC # BLD: 13.27 K/UL — HIGH (ref 3.8–10.5)
WBC # FLD AUTO: 13.27 K/UL — HIGH (ref 3.8–10.5)

## 2019-06-13 PROCEDURE — 99233 SBSQ HOSP IP/OBS HIGH 50: CPT

## 2019-06-13 PROCEDURE — 71045 X-RAY EXAM CHEST 1 VIEW: CPT | Mod: 26

## 2019-06-13 RX ORDER — POTASSIUM CHLORIDE 20 MEQ
40 PACKET (EA) ORAL ONCE
Refills: 0 | Status: COMPLETED | OUTPATIENT
Start: 2019-06-13 | End: 2019-06-13

## 2019-06-13 RX ORDER — DILTIAZEM HCL 120 MG
1 CAPSULE, EXT RELEASE 24 HR ORAL
Qty: 30 | Refills: 0
Start: 2019-06-13 | End: 2019-07-12

## 2019-06-13 RX ORDER — OXYCODONE HYDROCHLORIDE 5 MG/1
1 TABLET ORAL
Qty: 20 | Refills: 0
Start: 2019-06-13 | End: 2019-06-16

## 2019-06-13 RX ORDER — DILTIAZEM HCL 120 MG
240 CAPSULE, EXT RELEASE 24 HR ORAL DAILY
Refills: 0 | Status: DISCONTINUED | OUTPATIENT
Start: 2019-06-13 | End: 2019-06-13

## 2019-06-13 RX ADMIN — Medication 40 MILLIEQUIVALENT(S): at 06:36

## 2019-06-13 RX ADMIN — Medication 100 MILLIGRAM(S): at 05:11

## 2019-06-13 RX ADMIN — SENNA PLUS 2 TABLET(S): 8.6 TABLET ORAL at 05:12

## 2019-06-13 RX ADMIN — Medication 650 MILLIGRAM(S): at 11:37

## 2019-06-13 RX ADMIN — OXYCODONE HYDROCHLORIDE 5 MILLIGRAM(S): 5 TABLET ORAL at 08:30

## 2019-06-13 RX ADMIN — Medication 650 MILLIGRAM(S): at 05:19

## 2019-06-13 RX ADMIN — Medication 60 MILLIGRAM(S): at 05:11

## 2019-06-13 RX ADMIN — SODIUM CHLORIDE 75 MILLILITER(S): 9 INJECTION, SOLUTION INTRAVENOUS at 08:31

## 2019-06-13 RX ADMIN — Medication 650 MILLIGRAM(S): at 05:50

## 2019-06-13 RX ADMIN — Medication 650 MILLIGRAM(S): at 12:30

## 2019-06-13 RX ADMIN — OXYCODONE HYDROCHLORIDE 5 MILLIGRAM(S): 5 TABLET ORAL at 09:18

## 2019-06-13 NOTE — DISCHARGE NOTE PROVIDER - NSDCCPCAREPLAN_GEN_ALL_CORE_FT
PRINCIPAL DISCHARGE DIAGNOSIS  Diagnosis: Lung nodules  Assessment and Plan of Treatment: Please walk 4-5 x per day, Increase as tolerated. You may climb stairs. Continue to use incentive spirometer.   You may keep all wounds uncovered. Please shower daily. The suture will be removed in office at follow up apt.   See Dr. Ibarra's office in 2 weeks. Please call [phone] for an apt. Please get an CXR the day before your appointment and bring it with you to your follow up appointment.  Please call the office at [phone] if you have fever's chills, worsening shortness of breath, chest pain, warmth, redness or purulent discharge from the insertion site.

## 2019-06-13 NOTE — DISCHARGE NOTE PROVIDER - HOSPITAL COURSE
59 y/o M, former smoker, w/ hx of asthma, COPD, lung nodule followed by Dr. Vázquez since 2015 and has found increased in size on CT. Pt had a FNA at [facility] which resulted in adenoCA. Pt underwent a s/p R VATs RUL Lobectomy on 6/10/19. Post op course complicated by Afib/RVR which required a Cardizem drip. Cardizem was converted to PO and patient returned to a NSR. Pt's chest tube was removed on 6/12/19, post op CXR reviewed. Pt cleared for d/c on 6/13/19.

## 2019-06-13 NOTE — DISCHARGE NOTE PROVIDER - NSDCACTIVITY_GEN_ALL_CORE
No heavy lifting/straining/Showering allowed/Do not drive or operate machinery/Walking - Indoors allowed/Walking - Outdoors allowed

## 2019-06-13 NOTE — DISCHARGE NOTE NURSING/CASE MANAGEMENT/SOCIAL WORK - NSDCDPATPORTLINK_GEN_ALL_CORE
You can access the [facility], offered by [facility], by registering with the following website: [email or web address]

## 2019-06-13 NOTE — DISCHARGE NOTE NURSING/CASE MANAGEMENT/SOCIAL WORK - NSDCPNINST_GEN_ALL_CORE
patient provided with all d/c information including education about spirometer use, pain medication, cardizem, and followup care; verbalized understanding   patient declined wheel chair and will walk off unit with escort and wife

## 2019-06-13 NOTE — PROGRESS NOTE ADULT - SUBJECTIVE AND OBJECTIVE BOX
ANDRESSA MA                     [id number]    HPI:  The patient is a 67y Male who was seen, evaluated, & examined with the CTICU staff post-operatively with a multidisciplinary care plan formulated & implemented.  All available clinical, laboratory, radiographic, pharmacologic, and electrocardiographic data were reviewed & analyzed.      Procedure: s/p R VATs RUL Lobectomy      Issues:  A fib, w RVR  Post op pain  Lung Nodule        PAST MEDICAL & SURGICAL HISTORY:  Fatty liver  Asthma: Denies any recent hospitalization for Asthma  Malignant neoplasm of unspecified part of right bronchus or lung  No significant past surgical history            VITAL SIGNS:  Vital Signs Last 24 Hrs  T(C): 36.7 (13 Jun 2019 04:00), Max: 37.2 (12 Jun 2019 08:00)  T(F): 98 (13 Jun 2019 04:00), Max: 99 (12 Jun 2019 08:00)  HR: 89 (13 Jun 2019 06:00) (81 - 100)  BP: 111/65 (13 Jun 2019 06:00) (100/65 - 115/70)  BP(mean): 76 (13 Jun 2019 06:00) (71 - 80)  RR: 20 (13 Jun 2019 06:00) (16 - 25)  SpO2: 94% (13 Jun 2019 06:00) (93% - 97%)    I/Os:   I&O's Detail    11 Jun 2019 07:01  -  12 Jun 2019 07:00  --------------------------------------------------------  IN:    diltiazem Infusion: 170 mL    IV PiggyBack: 100 mL    lactated ringers.: 720 mL    Oral Fluid: 720 mL    phenylephrine   Infusion: 109.8 mL  Total IN: 1819.8 mL    OUT:    Chest Tube: 15 mL    Voided: 1350 mL  Total OUT: 1365 mL    Total NET: 454.8 mL      12 Jun 2019 07:01  -  13 Jun 2019 06:55  --------------------------------------------------------  IN:    IV PiggyBack: 100 mL    lactated ringers.: 1710 mL    Oral Fluid: 480 mL  Total IN: 2290 mL    OUT:    Chest Tube: 5 mL    Voided: 1900 mL  Total OUT: 1905 mL    Total NET: 385 mL          CAPILLARY BLOOD GLUCOSE          =======================MEDICATIONS===================  MEDICATIONS  (STANDING):  acetaminophen   Tablet .. 650 milliGRAM(s) Oral every 6 hours  diltiazem    Tablet 60 milliGRAM(s) Oral every 6 hours  diltiazem Infusion 5 mG/Hr (5 mL/Hr) IV Continuous <Continuous>  docusate sodium 100 milliGRAM(s) Oral three times a day  lactated ringers. 1000 milliLiter(s) (75 mL/Hr) IV Continuous <Continuous>  phenylephrine    Infusion 0.3 MICROgram(s)/kG/Min (7.65 mL/Hr) IV Continuous <Continuous>  senna 2 Tablet(s) Oral at bedtime    MEDICATIONS  (PRN):  HYDROmorphone  Injectable 0.5 milliGRAM(s) IV Push every 3 hours PRN Breakthrough Pain  oxyCODONE    IR 5 milliGRAM(s) Oral every 3 hours PRN Moderate Pain (4 - 6)  oxyCODONE    IR 10 milliGRAM(s) Oral every 3 hours PRN Severe Pain (7 - 10)        PHYSICAL EXAM============================  General:                         Awake, alert, not in any distress  Neuro:                            Moving all extremities to commands.   Respiratory:	Air entry fair and  bilateral conducted sounds                                           Effort even and unlabored.  CV:		Regular rate and rhythm. Normal S1/S2                                          Distal pulses present.  Abdomen:	                     Soft, non-distended. Bowel sounds present   Skin:		No rash.  Extremities:	Warm, no cyanosis or edema.  Palpable pulses    ============================LABS=========================                        13.8   13.27 )-----------( 237      ( 13 Jun 2019 04:56 )             41.3     06-13    140  |  103  |  9   ----------------------------<  114<H>  3.7   |  24  |  0.71    Ca    8.6      13 Jun 2019 04:56  Phos  2.6     06-13  Mg     1.9     06-13                ============================IMAGING STUDIES=========================  < from: Xray Chest 1 View- PORTABLE-Urgent (06.12.19 @ 12:31) >  7:30 PM:  Since the last exam, the right chest tube has been removed. No   complications associated with this. No pneumothorax. Decreasing   subcutaneous emphysema. Bibasilar postop streaky atelectasis.      COMPARISON:  June 11      IMPRESSION:  Follow-up studies post right upper lobectomy pre and post   chest tube removal.      =============================NEUROLOGY============================  Pain control with PCA / Tylenol IV     ==============================RESPIRATORY========================  Pt is on   2  L nasal canula   Comfortable, not in any distress.  Using incentive spirometry   Chest tube d/c'd  Continue bronchodilators, pulmonary toilet    ============================CARDIOVASCULAR======================  Continue hemodynamic monitoring.  persistent cardiopulmonary dysfunction  atrial fibrillation with rapid ventricular response-cardiovascular dysfunction  In NSR   =====================RENAL===================  Continue LR 30CC/hr    Monitor I/Os and electrolytes    ====================GASTROINTESTINAL===================  On regular, tolerating  Continue GI prophylaxis with Pepcid / Protonix  Continue Zofran / Reglan for nausea - PRN	    ========================HEMATOLOGIC/ONCOLOGIC====================  Monitor chest tube output. No signs of active bleeding.   Follow CBC in AM    ============================INFECTIOUS DISEASE========================  Monitor for fever / leukocytosis.  All surgical incision / chest tube  sites look clean      Pt is on GI & DVT prophylaxis  OOB & ambulate       Pertinent clinical, laboratory, radiographic, hemodynamic, echocardiographic, respiratory data, microbiologic data and chart were reviewed and analyzed frequently throughout the course of the day and night  Patient seen, examined and plan discussed with CT Surgery / CTICU team during rounds.    Pt's status discussed with family at bedside, updated status        Paula Redd DO FACEP
ANDRESSA MA  MRN#: [id number]     The patient is a 67y Male who was seen, evaluated, & examined with the CTICU staff post-operatively with a multidisciplinary care plan formulated & implemented.  All available clinical, laboratory, radiographic, pharmacologic, and electrocardiographic data were reviewed & analyzed.      The patient was in the CTICU in critical condition secondary to:     persistent cardiopulmonary dysfunction  atrial fibrillation with rapid ventricular response-cardiovascular dysfunction  undifferentiated shock    For support and evaluation & prevention of further decompensation secondary to persistent cardiopulmonary dysfunction and atrial fibrillation with rapid ventricular response-cardiovascular dysfunction, respiratory status required:     supplemental oxygen with nasal cannula  continuous pulse oximetry monitoring  following ABGs with A-line monitoring    Invasive hemodynamic monitoring with     an A-line was required for continuous MAP/BP monitoring     to ensure adequate cardiovascular support and to evaluate for & help prevent decompensation while receiving     IV Cardizem infusion  IV Phenylephrine infusion    secondary to     atrial fibrillation with rapid ventricular response-cardiovascular dysfunction  undifferentiated shock    In addition:  Atrial fibrillation with rapid ventricular response post lobectomy refractory to Cardizem pushes and requiring Cardizem infusion  Despite Cardizem at 15 mg/hr, heart rate is still elevated in 140s yesterday although it decreased after addition of PO Cardizem  Patient had also become hypotensive with the onset of afib requiring Phenylephrine infusion  Now in sinus with rate in 80s, transitioned off of Phenylephrine overnight  Cardizem infusion stopped now, increased PO Cardizem to 60 mg PO q6h  Appears dry on exam, labs hemoconcentrated with slight anion gap acidosis likely due to dehydration; gave 1L crystalloid bolus, increasing maintenance fluids    The patient required critical care management and I personally provided 80 minutes of non-continuous care to the patient, excluding separate procedures, in addition to  discussing the patient and plan at length with the CTICU staff and helping coordinate care.
ANDRESSA MA  MRN#: [id number]     The patient is a 67y Male who was seen, evaluated, & examined with the CTICU staff post-operatively with a multidisciplinary care plan formulated & implemented.  All available clinical, laboratory, radiographic, pharmacologic, and electrocardiographic data were reviewed & analyzed.      The patient was in the CTICU in critical condition secondary to:     persistent cardiopulmonary dysfunction  atrial fibrillation with rapid ventricular response-cardiovascular dysfunction  undifferentiated shock    For support and evaluation & prevention of further decompensation secondary to persistent cardiopulmonary dysfunction and atrial fibrillation with rapid ventricular response-cardiovascular dysfunction, respiratory status required:     supplemental oxygen with nasal cannula  continuous pulse oximetry monitoring  following ABGs with A-line monitoring    Invasive hemodynamic monitoring with     an A-line was required for continuous MAP/BP monitoring     to ensure adequate cardiovascular support and to evaluate for & help prevent decompensation while receiving     IV Cardizem infusion  IV Phenylephrine infusion    secondary to     atrial fibrillation with rapid ventricular response-cardiovascular dysfunction  undifferentiated shock    In addition:  Atrial fibrillation with rapid ventricular response post lobectomy refractory to Cardizem pushes and requiring Cardizem infusion  Despite Cardizem at 15 mg/hr, heart rate is still elevated in 140s; may start Esmolol infusion or bolus with Amiodarone  Patient has also become hypotensive with the onset of afib requiring Phenylephrine infusion; volume status is even to slightly positive; will maintain  Appears euvolemic on exam; denies pain (controlled with PCA)  Electrolytes and hemoglobin wnl - will trend    The patient required critical care management and I personally provided 80 minutes of non-continuous care to the patient, excluding separate procedures, in addition to  discussing the patient and plan at length with the CTICU staff and helping coordinate care.
ANESTHESIA POSTOP CHECK    58y Male POD#1 s/p RUL Lobectomy  Patient seen and examined at bedside. Doing well with no complications. Tolerating IV PCA for pain control. +OOB to chair.     Vital Signs Last 24 Hrs  T(C): 37 (11 Jun 2019 08:00), Max: 37 (11 Jun 2019 08:00)  T(F): 98.6 (11 Jun 2019 08:00), Max: 98.6 (11 Jun 2019 08:00)  HR: 100 (11 Jun 2019 10:00) (83 - 103)  BP: 91/56 (11 Jun 2019 10:00) (84/59 - 134/81)  BP(mean): 64 (11 Jun 2019 10:00) (64 - 97)  RR: 15 (11 Jun 2019 10:00) (9 - 21)  SpO2: 93% (11 Jun 2019 10:00) (92% - 99%)  I&O's Summary    10 Leo 2019 07:01  -  11 Jun 2019 07:00  --------------------------------------------------------  IN: 687 mL / OUT: 470 mL / NET: 217 mL    11 Jun 2019 07:01  -  11 Jun 2019 11:15  --------------------------------------------------------  IN: 460 mL / OUT: 15 mL / NET: 445 mL        No apparent anesthetic complications.      Maggi Moya D.O.  Anesthesia CA-1   Pager [phone]
Anesthesia Pain Management Service    SUBJECTIVE: Patient is doing well with IV PCA and no significant problems reported. Tolerating diet.     Pain Scale Score	At rest: _3__ 	With Activity: __6_ 	[X ] Refer to charted pain scores    THERAPY:    [ ] IV PCA Morphine		[ ] 5 mg/mL	[ ] 1 mg/mL  [X ] IV PCA Hydromorphone	[ ] 5 mg/mL	[X ] 1 mg/mL  [ ] IV PCA Fentanyl		[ ] 50 micrograms/mL    Demand dose __0.2_ lockout __6_ (minutes) Continuous Rate _0__ Total: _4__   mg used (in past 24 hrs)      MEDICATIONS  (STANDING):  acetaminophen   Tablet .. 650 milliGRAM(s) Oral every 6 hours  diltiazem    Tablet 60 milliGRAM(s) Oral every 6 hours  diltiazem Infusion 5 mG/Hr (5 mL/Hr) IV Continuous <Continuous>  docusate sodium 100 milliGRAM(s) Oral three times a day  lactated ringers. 1000 milliLiter(s) (75 mL/Hr) IV Continuous <Continuous>  phenylephrine    Infusion 0.3 MICROgram(s)/kG/Min (7.65 mL/Hr) IV Continuous <Continuous>  senna 2 Tablet(s) Oral at bedtime    MEDICATIONS  (PRN):  HYDROmorphone  Injectable 0.5 milliGRAM(s) IV Push every 3 hours PRN Breakthrough Pain  oxyCODONE    IR 5 milliGRAM(s) Oral every 3 hours PRN Moderate Pain (4 - 6)  oxyCODONE    IR 10 milliGRAM(s) Oral every 3 hours PRN Severe Pain (7 - 10)      OBJECTIVE:    Sedation Score:	[ X] Alert	[ ] Drowsy 	[ ] Arousable	[ ] Asleep	[ ] Unresponsive    Side Effects:	[X ] None	[ ] Nausea	[ ] Vomiting	[ ] Pruritus  		[ ] Other:    Vital Signs Last 24 Hrs  T(C): 37.2 (12 Jun 2019 08:00), Max: 37.8 (11 Jun 2019 20:00)  T(F): 99 (12 Jun 2019 08:00), Max: 100 (11 Jun 2019 20:00)  HR: 87 (12 Jun 2019 10:00) (78 - 160)  BP: 107/657 (12 Jun 2019 10:00) (87/63 - 119/81)  BP(mean): 76 (12 Jun 2019 10:00) (63 - 91)  RR: 21 (12 Jun 2019 10:00) (14 - 26)  SpO2: 96% (12 Jun 2019 10:00) (93% - 97%)    ASSESSMENT/ PLAN    Therapy to  be:	[ ] Continue   [ X] Discontinued   [X ] Change to prn Analgesics    Documentation and Verification of current medications:   [X] Done	[ ] Not done, not elligible    Comments: Will continue Tylenol PO around the clock and switch IV PCA to PO Oxycodone as needed for pain, with IV Dilaudid available for breakthrough pain.    Progress Note written now but Patient was seen earlier.
Anesthesia Pain Management Service    SUBJECTIVE: Patient seen and examined at bedside. Doing well with IV PCA and no significant problems reported.    Pain Scale Score	At rest: _3_	[X ] Refer to charted pain scores    THERAPY:    [ ] IV PCA Morphine		[ ] 5 mg/mL	[ ] 1 mg/mL  [X ] IV PCA Hydromorphone	[ ] 5 mg/mL	[X ] 1 mg/mL  [ ] IV PCA Fentanyl		[ ] 50 micrograms/mL    Demand dose __0.2_ lockout __6_ (minutes) Continuous Rate _0__ Total: __0.7_  mg used (in past 24 hours)      MEDICATIONS  (STANDING):  acetaminophen  IVPB .. 1000 milliGRAM(s) IV Intermittent once  dexmedetomidine Infusion 0.4 MICROgram(s)/kG/Hr (6.8 mL/Hr) IV Continuous <Continuous>  docusate sodium 100 milliGRAM(s) Oral three times a day  HYDROmorphone PCA (1 mG/mL) 30 milliLiter(s) PCA Continuous PCA Continuous  lactated ringers. 1000 milliLiter(s) (30 mL/Hr) IV Continuous <Continuous>  senna 2 Tablet(s) Oral at bedtime    MEDICATIONS  (PRN):  diphenhydrAMINE   Injectable 25 milliGRAM(s) IV Push every 4 hours PRN Pruritus  HYDROmorphone PCA (1 mG/mL) Rescue Clinician Bolus 0.5 milliGRAM(s) IV Push every 15 minutes PRN for Pain Scale GREATER THAN 6  naloxone Injectable 0.1 milliGRAM(s) IV Push every 3 minutes PRN For ANY of the following changes in patient status:  A. RR LESS THAN 10 breaths per minute, B. Oxygen saturation LESS THAN 90%, C. Sedation score of 6  ondansetron Injectable 4 milliGRAM(s) IV Push every 6 hours PRN Nausea      OBJECTIVE:    Sedation Score:	[ X] Alert	[ ] Drowsy 	[ ] Arousable	[ ] Asleep	[ ] Unresponsive    Side Effects:	[X ] None	[ ] Nausea	[ ] Vomiting	[ ] Pruritus  		[ ] Other:    Vital Signs Last 24 Hrs  T(C): 37 (11 Jun 2019 08:00), Max: 37 (11 Jun 2019 08:00)  T(F): 98.6 (11 Jun 2019 08:00), Max: 98.6 (11 Jun 2019 08:00)  HR: 101 (11 Jun 2019 08:00) (83 - 101)  BP: 84/61 (11 Jun 2019 08:00) (84/59 - 134/81)  BP(mean): 67 (11 Jun 2019 08:00) (64 - 97)  RR: 18 (11 Jun 2019 08:00) (9 - 21)  SpO2: 94% (11 Jun 2019 08:00) (92% - 99%)    ASSESSMENT/ PLAN    Therapy to  be:	[ X] Continue   [ ] Discontinued   [ ] Change to prn Analgesics    Documentation and Verification of current medications:   [X] Done	[ ] Not done, not eligible    Comments: Continue IV PCA and PRN Tylenol.
RICHAR MAGODFREYJUAQUIN                     [id number]    HPI  Patient is a 58 year old male with a history of Lung nodule followed by Dr. Vázquez since 2015. Patient is s/p CT of the chest on 3/11/2019 - with abnormal findings, and has found increased in size to the lung nodule. Patient was referred to Dr. Ibarra for an evaluation.  Pre op diagnosis; Malignant neoplasm of unspecified part of right bronchus or lung. Patient is scheduled for Right video assisted thoracoscopy, robotic assisted right upper lobectomy, mediastinal lymph node dissection scheduled on 6/10/2019    Procedure: RVATS, Robotic Asssited RULobectomy, MLND      Issues:  Lung nodule  Post op pain    PAST MEDICAL & SURGICAL HISTORY:  Fatty liver  Asthma: Denies any recent hospitalization for Asthma  Malignant neoplasm of unspecified part of right bronchus or lung  No significant past surgical history            VITAL SIGNS:  Vital Signs Last 24 Hrs  T(C): 36.4 (11 Jun 2019 04:00), Max: 36.7 (10 Leo 2019 12:54)  T(F): 97.6 (11 Jun 2019 04:00), Max: 98 (10 Leo 2019 12:54)  HR: 101 (11 Jun 2019 06:00) (83 - 101)  BP: 91/59 (11 Jun 2019 05:00) (91/59 - 134/81)  BP(mean): 67 (11 Jun 2019 05:00) (67 - 97)  RR: 14 (11 Jun 2019 06:00) (9 - 21)  SpO2: 94% (11 Jun 2019 06:00) (92% - 99%)    I/Os:   I&O's Detail    10 Leo 2019 07:01  -  11 Jun 2019 06:17  --------------------------------------------------------  IN:    dexmedetomidine Infusion: 17 mL    Lactated Ringers IV Bolus: 250 mL    lactated ringers.: 390 mL  Total IN: 657 mL    OUT:    Chest Tube: 70 mL  Total OUT: 70 mL    Total NET: 587 mL          CAPILLARY BLOOD GLUCOSE          =======================MEDICATIONS===================  MEDICATIONS  (STANDING):  acetaminophen  IVPB .. 1000 milliGRAM(s) IV Intermittent once  dexmedetomidine Infusion 0.4 MICROgram(s)/kG/Hr (6.8 mL/Hr) IV Continuous <Continuous>  docusate sodium 100 milliGRAM(s) Oral three times a day  HYDROmorphone PCA (1 mG/mL) 30 milliLiter(s) PCA Continuous PCA Continuous  lactated ringers Bolus 250 milliLiter(s) IV Bolus once  lactated ringers. 1000 milliLiter(s) (30 mL/Hr) IV Continuous <Continuous>  senna 2 Tablet(s) Oral at bedtime    MEDICATIONS  (PRN):  diphenhydrAMINE   Injectable 25 milliGRAM(s) IV Push every 4 hours PRN Pruritus  HYDROmorphone PCA (1 mG/mL) Rescue Clinician Bolus 0.5 milliGRAM(s) IV Push every 15 minutes PRN for Pain Scale GREATER THAN 6  naloxone Injectable 0.1 milliGRAM(s) IV Push every 3 minutes PRN For ANY of the following changes in patient status:  A. RR LESS THAN 10 breaths per minute, B. Oxygen saturation LESS THAN 90%, C. Sedation score of 6  ondansetron Injectable 4 milliGRAM(s) IV Push every 6 hours PRN Nausea          PHYSICAL EXAM============================  General:                         Awake, alert, not in any distress  Neuro:                            Moving all extremities to commands.   Respiratory:	Air entry fair and  bilateral conducted sounds                                           Effort even and unlabored.  CV:		Regular rate and rhythm. Normal S1/S2                                          Distal pulses present.  Abdomen:	                     Soft, non-distended. Bowel sounds present   Skin:		No rash.  Extremities:	Warm, no cyanosis or edema.  Palpable pulses        =============================NEUROLOGY============================  Pain control with PCA /  Tylenol IV     ==============================RESPIRATORY========================  Pt is on  2 L nasal canula   Comfortable, not in any distress.  Using incentive spirometry   Monitor chest tube output  Chest tube to suction 	  Continue bronchodilators, pulmonary toilet    ============================CARDIOVASCULAR======================  Continue hemodynamic monitoring.  Not on any pressors    =====================RENAL===================  Continue LR 30CC/hr    Monitor I/Os and electrolytes    ====================GASTROINTESTINAL===================  On regulars,  tolerating  Continue GI prophylaxis with Pepcid / Protonix  Continue Zofran / Reglan for nausea - PRN	    ========================HEMATOLOGIC/ONCOLOGIC====================  Monitor chest tube output. No signs of active bleeding.   Follow CBC in AM    ============================INFECTIOUS DISEASE========================  Monitor for fever / leukocytosis.  All surgical incision / chest tube  sites look clean      Pt is on GI & DVT prophylaxis  OOB & ambulate       Pertinent clinical, laboratory, radiographic, hemodynamic, echocardiographic, respiratory data, microbiologic data and chart were reviewed and analyzed frequently throughout the course of the day and night  Patient seen, examined and plan discussed with CT Surgery / CTICU team during rounds.    Pt's status discussed with family at bedside, updated status        Paula SOLISP

## 2019-06-18 PROBLEM — K76.0 FATTY (CHANGE OF) LIVER, NOT ELSEWHERE CLASSIFIED: Chronic | Status: ACTIVE | Noted: 2019-06-03

## 2019-06-18 PROBLEM — J45.909 UNSPECIFIED ASTHMA, UNCOMPLICATED: Chronic | Status: ACTIVE | Noted: 2019-06-03

## 2019-06-18 PROBLEM — C34.91 MALIGNANT NEOPLASM OF UNSPECIFIED PART OF RIGHT BRONCHUS OR LUNG: Chronic | Status: ACTIVE | Noted: 2019-06-03

## 2019-06-25 ENCOUNTER — APPOINTMENT (OUTPATIENT)
Dept: THORACIC SURGERY | Facility: CLINIC | Age: 59
End: 2019-06-25
Payer: MEDICAID

## 2019-06-25 VITALS
WEIGHT: 156 LBS | HEART RATE: 89 BPM | OXYGEN SATURATION: 97 % | DIASTOLIC BLOOD PRESSURE: 68 MMHG | BODY MASS INDEX: 26.78 KG/M2 | RESPIRATION RATE: 18 BRPM | SYSTOLIC BLOOD PRESSURE: 103 MMHG | TEMPERATURE: 97.5 F

## 2019-06-25 PROCEDURE — 99213 OFFICE O/P EST LOW 20 MIN: CPT | Mod: 24

## 2019-06-25 PROCEDURE — 99024 POSTOP FOLLOW-UP VISIT: CPT

## 2019-08-08 ENCOUNTER — APPOINTMENT (OUTPATIENT)
Dept: THORACIC SURGERY | Facility: CLINIC | Age: 59
End: 2019-08-08
Payer: MEDICAID

## 2019-08-08 VITALS
TEMPERATURE: 97.6 F | WEIGHT: 158 LBS | HEART RATE: 90 BPM | OXYGEN SATURATION: 95 % | SYSTOLIC BLOOD PRESSURE: 118 MMHG | RESPIRATION RATE: 18 BRPM | BODY MASS INDEX: 27.12 KG/M2 | DIASTOLIC BLOOD PRESSURE: 80 MMHG

## 2019-08-08 PROCEDURE — 99024 POSTOP FOLLOW-UP VISIT: CPT

## 2019-12-12 ENCOUNTER — APPOINTMENT (OUTPATIENT)
Dept: THORACIC SURGERY | Facility: CLINIC | Age: 59
End: 2019-12-12
Payer: MEDICAID

## 2019-12-12 VITALS
RESPIRATION RATE: 18 BRPM | SYSTOLIC BLOOD PRESSURE: 124 MMHG | OXYGEN SATURATION: 95 % | DIASTOLIC BLOOD PRESSURE: 79 MMHG | BODY MASS INDEX: 27.29 KG/M2 | WEIGHT: 159 LBS | HEART RATE: 99 BPM | TEMPERATURE: 97.9 F

## 2019-12-12 PROCEDURE — 99213 OFFICE O/P EST LOW 20 MIN: CPT

## 2019-12-16 NOTE — ASSESSMENT
[FreeTextEntry1] : 60 y/o M, former smoker, w/ hx of asthma, COPD, lung CA.\par \par Now 6 mo s/p Rt VATS Robotic-assisted, RULobectomy, MLND on 6/10/19. 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 CT scan showed no evidence of recurrence, recommended patient to return to office in 6mo with CT Chest w/out contrast\par \par \par I personally performed the services described in the documentation, reviewed the documentation recorded by the scribe in my presence and it accurately and completely records my words and actions.\par \par I, Shanta Wells, EARLENE, am scribing for and the presence of BRITNI Contreras, the following sections HISTORY OF PRESENT ILLNESS, PAST MEDICAL/FAMILY/SOCIAL HISTORY; REVIEW OF SYSTEMS; VITAL SIGNS; PHYSICAL EXAM; DISPOSITION.\par \par

## 2019-12-16 NOTE — PHYSICAL EXAM
[Auscultation Breath Sounds / Voice Sounds] : lungs were clear to auscultation bilaterally [Heart Rate And Rhythm] : heart rate was normal and rhythm regular [Heart Sounds] : normal S1 and S2 [Heart Sounds Gallop] : no gallops [Murmurs] : no murmurs [Heart Sounds Pericardial Friction Rub] : no pericardial rub [Examination Of The Chest] : the chest was normal in appearance [Diminished Respiratory Excursion] : normal chest expansion [Chest Visual Inspection Thoracic Asymmetry] : no chest asymmetry [Bowel Sounds] : normal bowel sounds [Abdomen Soft] : soft [Abdomen Tenderness] : non-tender [Abdomen Mass (___ Cm)] : no abdominal mass palpated [Skin Color & Pigmentation] : normal skin color and pigmentation [Skin Turgor] : normal skin turgor [] : no rash [Sensation] : the sensory exam was normal to light touch and pinprick [Deep Tendon Reflexes (DTR)] : deep tendon reflexes were 2+ and symmetric [No Focal Deficits] : no focal deficits [Oriented To Time, Place, And Person] : oriented to person, place, and time [Impaired Insight] : insight and judgment were intact [Affect] : the affect was normal

## 2019-12-16 NOTE — HISTORY OF PRESENT ILLNESS
[FreeTextEntry1] : 58 y/o M, former smoker, w/ hx of asthma, COPD, lung nodule followed by Dr. Vázquez since 2015 and has found increased in size. \par \par CT Chest on 3/11/19 (compared to 1/2018 and 3/2015):\par - a 1.7cm ill-defined RUL ggo, stable from 2018 but increased from 6mm from 2015\par - a stable 5mm LLL nodule (series 3 image 94)\par - bronchiectasis \par - subpleural atelectasis and scarring in the posterior RUL\par \par FNA of RUL nodule on 4/22/19 at [facility]/. Path revealed AdenoCA. +TTF-1 and cytokeratin; Ki-67 5%.\par \par PFTs on 4/29/19: FVC 94%, FEV1 44%, DLCO 101%.\par \par Now 6 mo s/p Rt VATS Robotic-assisted, RULobectomy, MLND on 6/10/19. 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 Admits to on/off mild SOB on exertion. Denies CP or cough.

## 2019-12-16 NOTE — DATA REVIEWED
[FreeTextEntry1] : CT Chest on 12/4/19:\par - post-op changes\par - stable 5mm LLL ggo (3:58)\par - ELENA

## 2019-12-16 NOTE — CONSULT LETTER
[Dear  ___] : Dear  [unfilled], [( Thank you for referring [unfilled] for consultation for _____ )] : Thank you for referring [unfilled] for consultation for [unfilled] [Consult Letter:] : I had the pleasure of evaluating your patient, [unfilled]. [Consult Closing:] : Thank you very much for allowing me to participate in the care of this patient.  If you have any questions, please do not hesitate to contact me. [Please see my note below.] : Please see my note below. [Sincerely,] : Sincerely, [DrMarci  ___] : Dr. GARSIA [FreeTextEntry2] : Esteban Vázuqez MD (Pul/Referring)\par Ashlyn Moore MD (PCP)  [FreeTextEntry3] : Louie Ibarra MD, MPH \par System Director of Thoracic Surgery \par Director of Comprehensive Lung and Foregut [place] \par Professor Cardiovascular & Thoracic Surgery  \par [facility] [employer] at [facility]\par

## 2020-06-18 ENCOUNTER — APPOINTMENT (OUTPATIENT)
Dept: THORACIC SURGERY | Facility: CLINIC | Age: 60
End: 2020-06-18
Payer: MEDICAID

## 2020-06-18 VITALS
DIASTOLIC BLOOD PRESSURE: 75 MMHG | SYSTOLIC BLOOD PRESSURE: 116 MMHG | RESPIRATION RATE: 18 BRPM | OXYGEN SATURATION: 98 % | TEMPERATURE: 97.3 F | WEIGHT: 164 LBS | BODY MASS INDEX: 28.15 KG/M2 | HEART RATE: 80 BPM

## 2020-06-18 PROCEDURE — 99213 OFFICE O/P EST LOW 20 MIN: CPT

## 2020-06-19 NOTE — ASSESSMENT
[FreeTextEntry1] : 60 y/o M, former smoker, w/ hx of asthma, COPD, and Lung CA.\par \par Now 1yr s/p Rt VATS Robotic-assisted, RULobectomy, MLND on 6/10/19. 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 CT scan showed no evidence of recurrence, recommended patient to return to office in 6mo w/ CT Chest w/o contrast\par \par \par \par I personally performed the services described in the documentation, reviewed the documentation recorded by the scribe in my presence and it accurately and completely records my words and actions.\par \par I, Shanta Wells NP, am scribing for and the presence of BRITNI Contreras, the following sections HISTORY OF PRESENT ILLNESS, PAST MEDICAL/FAMILY/SOCIAL HISTORY; REVIEW OF SYSTEMS; VITAL SIGNS; PHYSICAL EXAM; DISPOSITION.\par \par

## 2020-06-19 NOTE — DATA REVIEWED
[FreeTextEntry1] : CT Chest on 6/10/2020:\par - post-op changes\par - stable 5mm LLL ggo (3:63)\par - ELENA

## 2020-06-19 NOTE — PHYSICAL EXAM
[Heart Rate And Rhythm] : heart rate was normal and rhythm regular [Auscultation Breath Sounds / Voice Sounds] : lungs were clear to auscultation bilaterally [Murmurs] : no murmurs [Heart Sounds Gallop] : no gallops [Heart Sounds] : normal S1 and S2 [Examination Of The Chest] : the chest was normal in appearance [Heart Sounds Pericardial Friction Rub] : no pericardial rub [Chest Visual Inspection Thoracic Asymmetry] : no chest asymmetry [Diminished Respiratory Excursion] : normal chest expansion [Bowel Sounds] : normal bowel sounds [Abdomen Soft] : soft [Abdomen Tenderness] : non-tender [Abdomen Mass (___ Cm)] : no abdominal mass palpated [Abnormal Walk] : normal gait [Nail Clubbing] : no clubbing  or cyanosis of the fingernails [Musculoskeletal - Swelling] : no joint swelling seen [Motor Tone] : muscle strength and tone were normal [Skin Color & Pigmentation] : normal skin color and pigmentation [Skin Turgor] : normal skin turgor [] : no rash [Deep Tendon Reflexes (DTR)] : deep tendon reflexes were 2+ and symmetric [Sensation] : the sensory exam was normal to light touch and pinprick [Oriented To Time, Place, And Person] : oriented to person, place, and time [No Focal Deficits] : no focal deficits [Impaired Insight] : insight and judgment were intact [Affect] : the affect was normal

## 2020-06-19 NOTE — HISTORY OF PRESENT ILLNESS
[FreeTextEntry1] : 58 y/o M, former smoker, w/ hx of asthma, COPD, and lung CA.\par \par CT Chest on 3/11/19 (compared to 1/2018 and 3/2015):\par - a 1.7cm ill-defined RUL ggo, stable from 2018 but increased from 6mm from 2015\par - a stable 5mm LLL nodule (series 3 image 94)\par - bronchiectasis \par - subpleural atelectasis and scarring in the posterior RUL\par \par FNA of RUL nodule on 4/22/19 at [facility]/. Path revealed AdenoCA. +TTF-1 and cytokeratin; Ki-67 5%.\par \par PFTs on 4/29/19: FVC 94%, FEV1 44%, DLCO 101%.\par \par Now 1yr s/p Rt VATS Robotic-assisted, RULobectomy, MLND on 6/10/19. 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 Admits to SOB on exertion. Denies CP or cough.

## 2020-06-19 NOTE — CONSULT LETTER
[Dear  ___] : Dear  [unfilled], [Consult Letter:] : I had the pleasure of evaluating your patient, [unfilled]. [( Thank you for referring [unfilled] for consultation for _____ )] : Thank you for referring [unfilled] for consultation for [unfilled] [Please see my note below.] : Please see my note below. [Consult Closing:] : Thank you very much for allowing me to participate in the care of this patient.  If you have any questions, please do not hesitate to contact me. [Sincerely,] : Sincerely, [DrMarci  ___] : Dr. GARSIA [FreeTextEntry2] : Esteban Vázquez MD (Pul/Referring)\par Ashlyn Moore MD (PCP)  [FreeTextEntry3] : Louie Ibarra MD, MPH \par System Director of Thoracic Surgery \par Director of Comprehensive Lung and Foregut [place] \par Professor Cardiovascular & Thoracic Surgery  \par [facility] at [facility]\par

## 2020-12-17 ENCOUNTER — APPOINTMENT (OUTPATIENT)
Dept: THORACIC SURGERY | Facility: CLINIC | Age: 60
End: 2020-12-17
Payer: MEDICAID

## 2020-12-17 PROCEDURE — 99443: CPT

## 2020-12-20 NOTE — DATA REVIEWED
[FreeTextEntry1] : CT chest on 12/10/2020:\par - post-op changes\par - 4 mm LLL, unchanged\par - stable subpleural effusion/atelectasis left lung base

## 2020-12-20 NOTE — CONSULT LETTER
[Dear  ___] : Dear  [unfilled], [Consult Letter:] : I had the pleasure of evaluating your patient, [unfilled]. [( Thank you for referring [unfilled] for consultation for _____ )] : Thank you for referring [unfilled] for consultation for [unfilled] [Please see my note below.] : Please see my note below. [Consult Closing:] : Thank you very much for allowing me to participate in the care of this patient.  If you have any questions, please do not hesitate to contact me. [Sincerely,] : Sincerely, [FreeTextEntry2] : Esteban Vázquez MD (Pul/Referring)\par Ashlyn Moore MD (PCP)  [FreeTextEntry3] : Louie Ibrara MD, MPH \par System Director of Thoracic Surgery \par Director of Comprehensive Lung and Foregut [place] \par Professor Cardiovascular & Thoracic Surgery \par [facility] at [facility]\par \par

## 2020-12-20 NOTE — HISTORY OF PRESENT ILLNESS
[FreeTextEntry1] : 61 y/o M, former smoker, w/ hx of asthma, COPD, and lung CA.\par \par CT Chest on 3/11/19 (compared to 1/2018 and 3/2015):\par - a 1.7cm ill-defined RUL ggo, stable from 2018 but increased from 6mm from 2015\par - a stable 5mm LLL nodule (series 3 image 94)\par - bronchiectasis \par - subpleural atelectasis and scarring in the posterior RUL\par \par FNA of RUL nodule on 4/22/19 at [facility]/. Path revealed AdenoCA. +TTF-1 and cytokeratin; Ki-67 5%.\par \par PFTs on 4/29/19: FVC 94%, FEV1 44%, DLCO 101%.\par \par Now 1.5 yr s/p Rt VATS Robotic-assisted, RULobectomy, MLND on 6/10/19. 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 Patient denies shortness of breath, cough, chest pain, fever, chills.

## 2020-12-20 NOTE — ASSESSMENT
[FreeTextEntry1] : 59 y/o M, former smoker, w/ hx of asthma, COPD, and lung CA.\par \par Now 1.5 yr s/p Rt VATS Robotic-assisted, RULobectomy, MLND on 6/10/19. 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 CT chest reviewed and explained to patient, I recommended patient to return to office in 6 months with CT Chest without contrast.\par \par I have spent 25 minutes on the phone discussing above result and plan of care with patient.\par \par I personally performed the services described in the documentation, reviewed the documentation recorded by the scribe in my presence and it accurately and completely records my words and actions.\par \par I, Zaida Gutierres, EARLENE, am scribing for and the presence of BRITNI Contreras, the following sections HISTORY OF PRESENT ILLNESS, PAST MEDICAL/FAMILY/SOCIAL HISTORY; REVIEW OF SYSTEMS; VITAL SIGNS; PHYSICAL EXAM; DISPOSITION.

## 2021-06-10 ENCOUNTER — APPOINTMENT (OUTPATIENT)
Dept: THORACIC SURGERY | Facility: CLINIC | Age: 61
End: 2021-06-10
Payer: MEDICAID

## 2021-06-17 ENCOUNTER — APPOINTMENT (OUTPATIENT)
Dept: THORACIC SURGERY | Facility: CLINIC | Age: 61
End: 2021-06-17
Payer: MEDICAID

## 2021-06-17 VITALS
WEIGHT: 155 LBS | BODY MASS INDEX: 26.61 KG/M2 | RESPIRATION RATE: 18 BRPM | HEART RATE: 80 BPM | SYSTOLIC BLOOD PRESSURE: 115 MMHG | DIASTOLIC BLOOD PRESSURE: 74 MMHG | OXYGEN SATURATION: 97 % | TEMPERATURE: 97.8 F

## 2021-06-17 PROCEDURE — 99213 OFFICE O/P EST LOW 20 MIN: CPT

## 2021-06-18 NOTE — PHYSICAL EXAM
[] : no respiratory distress [Auscultation Breath Sounds / Voice Sounds] : lungs were clear to auscultation bilaterally [Heart Sounds] : normal S1 and S2 [Heart Rate And Rhythm] : heart rate was normal and rhythm regular [Heart Sounds Gallop] : no gallops [Murmurs] : no murmurs [Heart Sounds Pericardial Friction Rub] : no pericardial rub [Examination Of The Chest] : the chest was normal in appearance [Chest Visual Inspection Thoracic Asymmetry] : no chest asymmetry [Diminished Respiratory Excursion] : normal chest expansion

## 2021-06-19 NOTE — DATA REVIEWED
[FreeTextEntry1] : I have independently reviewed patient's CT on 6/8/21:\par CT Chest on 6/8/21:\par - post op change\par - No evidence of local tumor recurrence or metastatic disease within the chest\par - minimal new tree-in-bud nodularity within the anterior RLL (3: 56), likely postinflammatory\par - stable 4 mm LLL ggo (3:58), likely benign\par - stable emphysema

## 2021-06-19 NOTE — CONSULT LETTER
[FreeTextEntry2] : Esteban Vázquez MD (Pul/Referring)\par Ashlyn Moore MD (PCP) \par  [FreeTextEntry3] : Louie Ibarra MD, MPH \par System Director of Thoracic Surgery \par Director of Comprehensive Lung and Foregut [place] \par Professor Cardiovascular & Thoracic Surgery  \par [facility] at [facility]\par \par [facility]\par [address]\par Oncology [address]\[place] [address]\par Tel: [phone]\par Fax: [phone]\par

## 2021-06-19 NOTE — HISTORY OF PRESENT ILLNESS
[FreeTextEntry1] : 59 y/o M, former smoker, w/ hx of asthma, COPD, and lung CA.\par FNA of RUL nodule on 4/22/19 at [facility]/. Path revealed AdenoCA. +TTF-1 and cytokeratin; Ki-67 5%.\par \par Now 2 yr s/p Rt VATS Robotic-assisted, RULobectomy, MLND on 6/10/19. 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 Patient c/o SOB on exertion, denies  cough, chest pain, fever, chills. \par

## 2021-06-19 NOTE — ASSESSMENT
[FreeTextEntry1] : 61 y/o M, former smoker, w/ hx of asthma, COPD, and lung CA.\par FNA of RUL nodule on 4/22/19 at [facility]/. Path revealed AdenoCA. +TTF-1 and cytokeratin; Ki-67 5%.\par \par Now 2 yr s/p Rt VATS Robotic-assisted, RULobectomy, MLND on 6/10/19. 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 CT chest reviewed and explained to patient, I recommended patient to return to office in 12 months with CT Chest without contrast.\par \par I personally performed the services described in the documentation, reviewed the documentation recorded by the scribe in my presence and it accurately and completely records my words and actions.\par \par I, Zaida Gutierres NP, am scribing for and the presence of BRITNI Contreras, the following sections HISTORY OF PRESENT ILLNESS, PAST MEDICAL/FAMILY/SOCIAL HISTORY; REVIEW OF SYSTEMS; VITAL SIGNS; PHYSICAL EXAM; DISPOSITION.

## 2022-01-06 NOTE — PATIENT PROFILE ADULT - IS THERE A SUSPICION OF ABUSE/NEGLIGENCE?
Detail Level: Zone
Doxycycline Pregnancy And Lactation Text: This medication is Pregnancy Category D and not consider safe during pregnancy. It is also excreted in breast milk but is considered safe for shorter treatment courses.
Topical Retinoid Pregnancy And Lactation Text: This medication is Pregnancy Category C. It is unknown if this medication is excreted in breast milk.
High Dose Vitamin A Counseling: Side effects reviewed, pt to contact office should one occur.
Topical Sulfur Applications Counseling: Topical Sulfur Counseling: Patient counseled that this medication may cause skin irritation or allergic reactions.  In the event of skin irritation, the patient was advised to reduce the amount of the drug applied or use it less frequently.   The patient verbalized understanding of the proper use and possible adverse effects of topical sulfur application.  All of the patient's questions and concerns were addressed.
Azithromycin Counseling:  I discussed with the patient the risks of azithromycin including but not limited to GI upset, allergic reaction, drug rash, diarrhea, and yeast infections.
Sarecycline Counseling: Patient advised regarding possible photosensitivity and discoloration of the teeth, skin, lips, tongue and gums.  Patient instructed to avoid sunlight, if possible.  When exposed to sunlight, patients should wear protective clothing, sunglasses, and sunscreen.  The patient was instructed to call the office immediately if the following severe adverse effects occur:  hearing changes, easy bruising/bleeding, severe headache, or vision changes.  The patient verbalized understanding of the proper use and possible adverse effects of sarecycline.  All of the patient's questions and concerns were addressed.
Tetracycline Counseling: Patient counseled regarding possible photosensitivity and increased risk for sunburn.  Patient instructed to avoid sunlight, if possible.  When exposed to sunlight, patients should wear protective clothing, sunglasses, and sunscreen.  The patient was instructed to call the office immediately if the following severe adverse effects occur:  hearing changes, easy bruising/bleeding, severe headache, or vision changes.  The patient verbalized understanding of the proper use and possible adverse effects of tetracycline.  All of the patient's questions and concerns were addressed. Patient understands to avoid pregnancy while on therapy due to potential birth defects.
Use Enhanced Medication Counseling?: No
Topical Retinoid counseling:  Patient advised to apply a pea-sized amount only at bedtime and wait 30 minutes after washing their face before applying.  If too drying, patient may add a non-comedogenic moisturizer. The patient verbalized understanding of the proper use and possible adverse effects of retinoids.  All of the patient's questions and concerns were addressed.
Isotretinoin Pregnancy And Lactation Text: This medication is Pregnancy Category X and is considered extremely dangerous during pregnancy. It is unknown if it is excreted in breast milk.
Birth Control Pills Counseling: Birth Control Pill Counseling: I discussed with the patient the potential side effects of OCPs including but not limited to increased risk of stroke, heart attack, thrombophlebitis, deep venous thrombosis, hepatic adenomas, breast changes, GI upset, headaches, and depression.  The patient verbalized understanding of the proper use and possible adverse effects of OCPs. All of the patient's questions and concerns were addressed.
Topical Clindamycin Pregnancy And Lactation Text: This medication is Pregnancy Category B and is considered safe during pregnancy. It is unknown if it is excreted in breast milk.
Topical Sulfur Applications Pregnancy And Lactation Text: This medication is Pregnancy Category C and has an unknown safety profile during pregnancy. It is unknown if this topical medication is excreted in breast milk.
Dapsone Counseling: I discussed with the patient the risks of dapsone including but not limited to hemolytic anemia, agranulocytosis, rashes, methemoglobinemia, kidney failure, peripheral neuropathy, headaches, GI upset, and liver toxicity.  Patients who start dapsone require monitoring including baseline LFTs and weekly CBCs for the first month, then every month thereafter.  The patient verbalized understanding of the proper use and possible adverse effects of dapsone.  All of the patient's questions and concerns were addressed.
Benzoyl Peroxide Counseling: Patient counseled that medicine may cause skin irritation and bleach clothing.  In the event of skin irritation, the patient was advised to reduce the amount of the drug applied or use it less frequently.   The patient verbalized understanding of the proper use and possible adverse effects of benzoyl peroxide.  All of the patient's questions and concerns were addressed.
Erythromycin Counseling:  I discussed with the patient the risks of erythromycin including but not limited to GI upset, allergic reaction, drug rash, diarrhea, increase in liver enzymes, and yeast infections.
High Dose Vitamin A Pregnancy And Lactation Text: High dose vitamin A therapy is contraindicated during pregnancy and breast feeding.
Tazorac Counseling:  Patient advised that medication is irritating and drying.  Patient may need to apply sparingly and wash off after an hour before eventually leaving it on overnight.  The patient verbalized understanding of the proper use and possible adverse effects of tazorac.  All of the patient's questions and concerns were addressed.
Azithromycin Pregnancy And Lactation Text: This medication is considered safe during pregnancy and is also secreted in breast milk.
Sarecycline Pregnancy And Lactation Text: This medication is Pregnancy Category D and not consider safe during pregnancy. It is also excreted in breast milk.
Birth Control Pills Pregnancy And Lactation Text: This medication should be avoided if pregnant and for the first 30 days post-partum.
Benzoyl Peroxide Pregnancy And Lactation Text: This medication is Pregnancy Category C. It is unknown if benzoyl peroxide is excreted in breast milk.
Minocycline Counseling: Patient advised regarding possible photosensitivity and discoloration of the teeth, skin, lips, tongue and gums.  Patient instructed to avoid sunlight, if possible.  When exposed to sunlight, patients should wear protective clothing, sunglasses, and sunscreen.  The patient was instructed to call the office immediately if the following severe adverse effects occur:  hearing changes, easy bruising/bleeding, severe headache, or vision changes.  The patient verbalized understanding of the proper use and possible adverse effects of minocycline.  All of the patient's questions and concerns were addressed.
Tazorac Pregnancy And Lactation Text: This medication is not safe during pregnancy. It is unknown if this medication is excreted in breast milk.
Bactrim Counseling:  I discussed with the patient the risks of sulfa antibiotics including but not limited to GI upset, allergic reaction, drug rash, diarrhea, dizziness, photosensitivity, and yeast infections.  Rarely, more serious reactions can occur including but not limited to aplastic anemia, agranulocytosis, methemoglobinemia, blood dyscrasias, liver or kidney failure, lung infiltrates or desquamative/blistering drug rashes.
Dapsone Pregnancy And Lactation Text: This medication is Pregnancy Category C and is not considered safe during pregnancy or breast feeding.
Spironolactone Counseling: Patient advised regarding risks of diarrhea, abdominal pain, hyperkalemia, birth defects (for female patients), liver toxicity and renal toxicity. The patient may need blood work to monitor liver and kidney function and potassium levels while on therapy. The patient verbalized understanding of the proper use and possible adverse effects of spironolactone.  All of the patient's questions and concerns were addressed.
Erythromycin Pregnancy And Lactation Text: This medication is Pregnancy Category B and is considered safe during pregnancy. It is also excreted in breast milk.
Doxycycline Counseling:  Patient counseled regarding possible photosensitivity and increased risk for sunburn.  Patient instructed to avoid sunlight, if possible.  When exposed to sunlight, patients should wear protective clothing, sunglasses, and sunscreen.  The patient was instructed to call the office immediately if the following severe adverse effects occur:  hearing changes, easy bruising/bleeding, severe headache, or vision changes.  The patient verbalized understanding of the proper use and possible adverse effects of doxycycline.  All of the patient's questions and concerns were addressed.
Topical Clindamycin Counseling: Patient counseled that this medication may cause skin irritation or allergic reactions.  In the event of skin irritation, the patient was advised to reduce the amount of the drug applied or use it less frequently.   The patient verbalized understanding of the proper use and possible adverse effects of clindamycin.  All of the patient's questions and concerns were addressed.
Isotretinoin Counseling: Patient should get monthly blood tests, not donate blood, not drive at night if vision affected, not share medication, and not undergo elective surgery for 6 months after tx completed. Side effects reviewed, pt to contact office should one occur.
Bactrim Pregnancy And Lactation Text: This medication is Pregnancy Category D and is known to cause fetal risk.  It is also excreted in breast milk.
Spironolactone Pregnancy And Lactation Text: This medication can cause feminization of the male fetus and should be avoided during pregnancy. The active metabolite is also found in breast milk.
Detail Level: Detailed
no

## 2022-06-16 ENCOUNTER — APPOINTMENT (OUTPATIENT)
Dept: THORACIC SURGERY | Facility: CLINIC | Age: 62
End: 2022-06-16
Payer: MEDICAID

## 2022-06-16 VITALS
HEART RATE: 81 BPM | DIASTOLIC BLOOD PRESSURE: 76 MMHG | WEIGHT: 156 LBS | TEMPERATURE: 98 F | RESPIRATION RATE: 18 BRPM | BODY MASS INDEX: 26.78 KG/M2 | OXYGEN SATURATION: 95 % | SYSTOLIC BLOOD PRESSURE: 117 MMHG

## 2022-06-16 PROCEDURE — 99214 OFFICE O/P EST MOD 30 MIN: CPT

## 2022-06-17 RX ORDER — THEOPHYLLINE 300 MG/1
300 TABLET, EXTENDED RELEASE ORAL
Qty: 30 | Refills: 0 | Status: ACTIVE | COMMUNITY
Start: 2022-05-28

## 2022-06-17 RX ORDER — BLOOD SUGAR DIAGNOSTIC
STRIP MISCELLANEOUS
Qty: 2 | Refills: 0 | Status: ACTIVE | COMMUNITY
Start: 2022-02-22

## 2022-06-17 RX ORDER — BUDESONIDE AND FORMOTEROL FUMARATE DIHYDRATE 160; 4.5 UG/1; UG/1
160-4.5 AEROSOL RESPIRATORY (INHALATION)
Qty: 10 | Refills: 0 | Status: ACTIVE | COMMUNITY
Start: 2022-04-06

## 2022-06-17 RX ORDER — TAMSULOSIN HYDROCHLORIDE 0.4 MG/1
0.4 CAPSULE ORAL
Qty: 30 | Refills: 0 | Status: ACTIVE | COMMUNITY
Start: 2022-04-26

## 2022-06-17 RX ORDER — UMECLIDINIUM 62.5 UG/1
62.5 AEROSOL, POWDER ORAL
Qty: 30 | Refills: 0 | Status: ACTIVE | COMMUNITY
Start: 2022-05-28

## 2022-06-17 RX ORDER — POLYVINYL ALCOHOL, POVIDONE .5; .6 G/100ML; G/100ML
0.5-0.6 LIQUID OPHTHALMIC
Qty: 15 | Refills: 0 | Status: ACTIVE | COMMUNITY
Start: 2022-01-07

## 2022-06-17 RX ORDER — ERGOCALCIFEROL 1.25 MG/1
1.25 MG CAPSULE, LIQUID FILLED ORAL
Qty: 4 | Refills: 0 | Status: ACTIVE | COMMUNITY
Start: 2022-05-28

## 2022-06-17 NOTE — ASSESSMENT
[FreeTextEntry1] : 60 y/o M, former smoker, w/ hx of asthma, COPD, and lung CA.\par FNA of RUL nodule on 4/22/19 at [facility]/Q. Path revealed AdenoCA. +TTF-1 and cytokeratin; Ki-67 5%.\par \par Now 3 yr s/p Rt VATS Robotic-assisted, RULobectomy, MLND on 6/10/19. Path revealed AdenoCA, acinar (90%) and micropapillary (10%), 1.6 x 1.2cm, G2, all margins and (0/23) LNs negative, pT1bN0 Stg IA2.\par \par CT Chest on 6/14/22:\par - post-op changes\par - two new ill-defined nodular opacities w/in the RLL measuring 1cm and 0.8cm (3:78 & 79)\par - stable LLL ggos measuring up to 4mm (3:64)\par \par I have reviewed the patient's medical records and diagnostic images at time of this office consultation and have made the following recommendation:\par 1. CT scan reviewed, two new RLL nodules, I recommended a course of Augmentin, then repeat CT Chest w/o contrast in 3mo.\par 2. RTC in 3mo.\par \par \par I personally performed the services described in the documentation, reviewed the documentation recorded by the scribe in my presence and it accurately and completely records my words and actions.\par \par I, Shanta Wells NP, am scribing for and the presence of BRITNI Contreras, the following sections HISTORY OF PRESENT ILLNESS, PAST MEDICAL/FAMILY/SOCIAL HISTORY; REVIEW OF SYSTEMS; VITAL SIGNS; PHYSICAL EXAM; DISPOSITION.\par \par

## 2022-06-17 NOTE — CONSULT LETTER
[Dear  ___] : Dear  [unfilled], [Consult Letter:] : I had the pleasure of evaluating your patient, [unfilled]. [( Thank you for referring [unfilled] for consultation for _____ )] : Thank you for referring [unfilled] for consultation for [unfilled] [Please see my note below.] : Please see my note below. [Consult Closing:] : Thank you very much for allowing me to participate in the care of this patient.  If you have any questions, please do not hesitate to contact me. [Sincerely,] : Sincerely, [DrMarci  ___] : Dr. GARSIA [FreeTextEntry2] : Esteban Vázquez MD (Pul/Referring)\par Ashlyn Moore MD (PCP) \par  \par  [FreeTextEntry3] : Louie Ibarra MD, MPH \par System Director of Thoracic Surgery \par Director of Comprehensive Lung and Foregut [place] \par Professor Cardiovascular & Thoracic Surgery  \par [facility] [employer] at [facility]\par \par [facility]\par [address]\par Oncology [address]\[place] [address]\par Tel: [phone]\par Fax: [phone]\par

## 2022-06-17 NOTE — HISTORY OF PRESENT ILLNESS
[FreeTextEntry1] : 62 y/o M, former smoker, w/ hx of asthma, COPD, and lung CA.\par FNA of RUL nodule on 4/22/19 at [facility]/. Path revealed AdenoCA. +TTF-1 and cytokeratin; Ki-67 5%.\par \par Now 3 yr s/p Rt VATS Robotic-assisted, RULobectomy, MLND on 6/10/19. Path revealed AdenoCA, acinar (90%) and micropapillary (10%), 1.6 x 1.2cm, G2, all margins and (0/23) LNs negative, pT1bN0 Stg IA2.\par \par CT chest on 12/10/2020:\par - post-op changes\par - 4 mm LLL, unchanged\par - stable subpleural effusion/atelectasis left lung base\par \par CT Chest on 6/8/21:\par - post op change\par - No evidence of local tumor recurrence or metastatic disease within the chest\par - minimal new tree-in-bud nodularity within the anterior RLL (3: 56), likely postinflammatory\par - stable 4 mm LLL ggo (3:58), likely benign\par - stable emphysema\par \par CT Chest on 6/14/22:\par - post-op changes\par - two new ill-defined nodular opacities w/in the RLL measuring 1cm and 0.8cm (3:78 & 79)\par - stable LLL ggos measuring up to 4mm (3:64)\par \par Patient is here today for a follow up. Denies SOB, CP, cough.\par

## 2022-09-22 ENCOUNTER — APPOINTMENT (OUTPATIENT)
Dept: THORACIC SURGERY | Facility: CLINIC | Age: 62
End: 2022-09-22

## 2022-09-22 VITALS
SYSTOLIC BLOOD PRESSURE: 107 MMHG | RESPIRATION RATE: 16 BRPM | OXYGEN SATURATION: 98 % | TEMPERATURE: 97.6 F | BODY MASS INDEX: 26.43 KG/M2 | HEART RATE: 82 BPM | WEIGHT: 154 LBS | DIASTOLIC BLOOD PRESSURE: 67 MMHG

## 2022-09-22 PROCEDURE — 99214 OFFICE O/P EST MOD 30 MIN: CPT

## 2022-09-23 NOTE — PHYSICAL EXAM
[Neck Appearance] : the appearance of the neck was normal [Neck Cervical Mass (___cm)] : no neck mass was observed [] : no respiratory distress [Auscultation Breath Sounds / Voice Sounds] : lungs were clear to auscultation bilaterally [Heart Rate And Rhythm] : heart rate was normal and rhythm regular [Heart Sounds] : normal S1 and S2 [Heart Sounds Gallop] : no gallops [Murmurs] : no murmurs [Heart Sounds Pericardial Friction Rub] : no pericardial rub [Examination Of The Chest] : the chest was normal in appearance [Chest Visual Inspection Thoracic Asymmetry] : no chest asymmetry [Diminished Respiratory Excursion] : normal chest expansion [Bowel Sounds] : normal bowel sounds [Abdomen Soft] : soft [Skin Color & Pigmentation] : normal skin color and pigmentation [Skin Turgor] : normal skin turgor [No Focal Deficits] : no focal deficits [Oriented To Time, Place, And Person] : oriented to person, place, and time [Affect] : the affect was normal [Mood] : the mood was normal

## 2022-09-23 NOTE — CONSULT LETTER
[Dear  ___] : Dear  [unfilled], [Consult Letter:] : I had the pleasure of evaluating your patient, [unfilled]. [( Thank you for referring [unfilled] for consultation for _____ )] : Thank you for referring [unfilled] for consultation for [unfilled] [Please see my note below.] : Please see my note below. [Consult Closing:] : Thank you very much for allowing me to participate in the care of this patient.  If you have any questions, please do not hesitate to contact me. [Sincerely,] : Sincerely, [DrMarci  ___] : Dr. GARSIA [FreeTextEntry2] : Esteban Vázquez MD (Pul/Referring)\par Ashlyn Moore MD (PCP) \par  \par  [FreeTextEntry3] : Louie Ibarra MD, MPH \par System Director of Thoracic Surgery \par Director of Comprehensive Lung and Foregut [place] \par Professor Cardiovascular & Thoracic Surgery  \par [facility] at [facility]\par \par [facility]\par [address]\par Oncology [address]\[place] [address]\par Tel: [phone]\par Fax: [phone]\par

## 2022-09-23 NOTE — ASSESSMENT
[FreeTextEntry1] : 63 y/o M, former smoker, w/ hx of asthma, COPD, and lung CA.\par FNA of RUL nodule on 4/22/19 at [facility]/Q. Path revealed AdenoCA. +TTF-1 and cytokeratin; Ki-67 5%.\par \par Now 3.5 yr s/p Rt VATS Robotic-assisted, RULobectomy, MLND on 6/10/19. Path revealed AdenoCA, acinar (90%) and micropapillary (10%), 1.6 x 1.2cm, G2, all margins and (0/23) LNs negative, pT1bN0 Stg IA2.\par \par CT Chest on 6/8/21:\par - post op change\par - No evidence of local tumor recurrence or metastatic disease within the chest\par - minimal new tree-in-bud nodularity within the anterior RLL (3: 56), likely postinflammatory\par - stable 4 mm LLL ggo (3:58), likely benign\par - stable emphysema\par \par CT Chest on 6/14/22:\par - post-op changes\par - two new ill-defined nodular opacities w/in the RLL measuring 1cm and 0.8cm (3:78 & 79)\par - stable LLL ggos measuring up to 4mm (3:64)\par \par Pt was given a course of Augmentin. \par \par CT Chest on 9/18/22:\par - post-op changes\par - resolved ill-defined nodular opacity in RLL \par - stable 3 mm ggo in LLL posteriorly (4:115)\par - stable 4 mm ggo in LLL anteriorly (4:133)\par \par \par \par I have reviewed the patient's medical records and diagnostic images at time of this office consultation and have made the following recommendation:\par 1. CT scan of 9/18 reviewed with pt today. \par 2. RTC in 1 year with CT chest. \par \par \par I personally performed the services described in the documentation, reviewed the documentation recorded by the scribe in my presence and it accurately and completely records my words and actions. \par \par I, Madison Flynn NP, am scribing for and the presence of BRITNI Contreras, the following sections HISTORY OF PRESENT ILLNESS, PAST MEDICAL/FAMILY/SOCIAL HISTORY; REVIEW OF SYSTEMS; VITAL SIGNS; PHYSICAL EXAM; DISPOSITION.\par

## 2022-09-23 NOTE — HISTORY OF PRESENT ILLNESS
[FreeTextEntry1] : 63 y/o M, former smoker, w/ hx of asthma, COPD, and lung CA.\par FNA of RUL nodule on 4/22/19 at [facility]/. Path revealed AdenoCA. +TTF-1 and cytokeratin; Ki-67 5%.\par \par Now 3.5 yr s/p Rt VATS Robotic-assisted, RULobectomy, MLND on 6/10/19. Path revealed AdenoCA, acinar (90%) and micropapillary (10%), 1.6 x 1.2cm, G2, all margins and (0/23) LNs negative, pT1bN0 Stg IA2.\par \par CT Chest on 6/8/21:\par - post op change\par - No evidence of local tumor recurrence or metastatic disease within the chest\par - minimal new tree-in-bud nodularity within the anterior RLL (3: 56), likely postinflammatory\par - stable 4 mm LLL ggo (3:58), likely benign\par - stable emphysema\par \par CT Chest on 6/14/22:\par - post-op changes\par - two new ill-defined nodular opacities w/in the RLL measuring 1cm and 0.8cm (3:78 & 79)\par - stable LLL ggos measuring up to 4mm (3:64)\par \par Pt was given a course of Augmentin. \par \par CT Chest on 9/18/22:\par - post-op changes\par - resolved ill-defined nodular opacity in RLL \par - stable 3 mm ggo in LLL posteriorly (4:115)\par - stable 4 mm ggo in LLL anteriorly (4:133)\par \par Pt presents today for follow up. The patient denies SOB, cough, chest pain, hemoptysis, palpitation, fever, recent illness, hospitalization and significant weight loss.\par \par \par

## 2023-04-21 NOTE — PATIENT PROFILE ADULT - NSPROPTRIGHTSUPPORTNAME_GEN_A_NUR
Rina Duncan
Apixaban/Eliquis is used to treat and prevent blood clots. If you are not able to swallow the tablets whole, they may be crushed and mixed in water, apple juice, or applesauce and promptly taken within four hours. Never skip a dose of Apixaban/Eliquis. If you forget to take your Apixaban/Eliquis, take a dose as soon as you remember. If it is almost time for your next Apixaban/Eliquis dose, wait until then and take a regular dose. DO NOT take an extra pill to ‘catch up’.  NEVER TAKE A DOUBLE DOSE. Notify your doctor that you missed a dose. Take Apixaban/Eliquis at the same time each morning and evening. Apixaban/Eliquis may be taken with other medication or food.

## 2023-09-20 PROBLEM — R91.8 LUNG NODULE, MULTIPLE: Status: ACTIVE | Noted: 2022-06-16

## 2023-09-21 ENCOUNTER — APPOINTMENT (OUTPATIENT)
Dept: THORACIC SURGERY | Facility: CLINIC | Age: 63
End: 2023-09-21
Payer: MEDICAID

## 2023-09-21 VITALS
HEART RATE: 98 BPM | OXYGEN SATURATION: 97 % | TEMPERATURE: 98.1 F | BODY MASS INDEX: 26.78 KG/M2 | SYSTOLIC BLOOD PRESSURE: 119 MMHG | WEIGHT: 156 LBS | DIASTOLIC BLOOD PRESSURE: 74 MMHG | RESPIRATION RATE: 16 BRPM

## 2023-09-21 DIAGNOSIS — C34.91 MALIGNANT NEOPLASM OF UNSPECIFIED PART OF RIGHT BRONCHUS OR LUNG: ICD-10-CM

## 2023-09-21 DIAGNOSIS — R91.8 OTHER NONSPECIFIC ABNORMAL FINDING OF LUNG FIELD: ICD-10-CM

## 2023-09-21 PROCEDURE — 99213 OFFICE O/P EST LOW 20 MIN: CPT

## 2023-09-21 RX ORDER — AMOXICILLIN AND CLAVULANATE POTASSIUM 500; 125 MG/1; MG/1
500-125 TABLET, FILM COATED ORAL
Qty: 14 | Refills: 0 | Status: COMPLETED | COMMUNITY
Start: 2022-06-16 | End: 2023-09-21

## 2024-09-26 ENCOUNTER — APPOINTMENT (OUTPATIENT)
Dept: THORACIC SURGERY | Facility: CLINIC | Age: 64
End: 2024-09-26
Payer: MEDICAID

## 2024-09-26 VITALS
DIASTOLIC BLOOD PRESSURE: 80 MMHG | HEART RATE: 90 BPM | SYSTOLIC BLOOD PRESSURE: 122 MMHG | BODY MASS INDEX: 27.64 KG/M2 | RESPIRATION RATE: 18 BRPM | WEIGHT: 161 LBS | OXYGEN SATURATION: 97 %

## 2024-09-26 DIAGNOSIS — R91.8 OTHER NONSPECIFIC ABNORMAL FINDING OF LUNG FIELD: ICD-10-CM

## 2024-09-26 DIAGNOSIS — C34.91 MALIGNANT NEOPLASM OF UNSPECIFIED PART OF RIGHT BRONCHUS OR LUNG: ICD-10-CM

## 2024-09-26 PROCEDURE — 99213 OFFICE O/P EST LOW 20 MIN: CPT

## 2024-09-26 NOTE — HISTORY OF PRESENT ILLNESS
[FreeTextEntry1] : 65 y/o M, former smoker, w/ hx of asthma, COPD, and lung CA. FNA of RUL nodule on 4/22/19 at [facility]/Q. Path revealed AdenoCA. +TTF-1 and cytokeratin; Ki-67 5%.  Now 5 yrs 3 mo s/p Rt VATS Robotic-assisted, RULobectomy, MLND on 6/10/19. Path revealed AdenoCA, acinar (90%) and micropapillary (10%), 1.6 x 1.2cm, G2, all margins and (0/23) LNs negative, pT1bN0 Stg IA2.  CT Chest on 6/8/21: - post op change - No evidence of local tumor recurrence or metastatic disease within the chest - minimal new tree-in-bud nodularity within the anterior RLL (3: 56), likely postinflammatory - stable 4 mm LLL ggo (3:58), likely benign - stable emphysema  CT Chest on 6/14/22: - post-op changes - two new ill-defined nodular opacities w/in the RLL measuring 1cm and 0.8cm (3:78 & 79) - stable LLL ggos measuring up to 4mm (3:64)  Pt was given a course of Augmentin.   CT Chest on 9/18/22: - post-op changes - resolved ill-defined nodular opacity in RLL  - stable 3 mm ggo in LLL posteriorly (4:115) - stable 4 mm ggo in LLL anteriorly (4:133)  [place] [facility] [place]-op changes - no focal site of pulmonary airspace consolidation. There is persistent minimal pulmonary emphysema with an upper lung distribution. - Multiple solid and sub-solid pulmonary nodules measuring up to 5 mm in size are unchanged since September 18, 2022; as two selected examples of many, there is an unchanged 5 mm left lower lobe sub-solid pulmonary nodule (series 5, image 179) and an unchanged 4 mm left lower lobe sub-solid pulmonary nodule (series 5, image 153).  - There is a persistent trace right pleural effusion, as on prior imaging. There is no left pleural effusion or pneumothorax on either side.  CT Chest on 09/24/2024: - Status post right upper lobectomy with stable postoperative changes in the right lung including mild scarring and volume loss. Diffuse bronchial/bronchiolar wall thickening and stable mild multifocal bronchiectasis. Stable minimal upper lobe predominant emphysema.  - Stable solid and subsolid pulmonary nodules measuring up to 5 mm in size, with 2 example nodules including a stable 5 mm left lower lobe subsolid nodule (series 5, image 196) and stable 4 mm left lower lobe subsolid nodule (series 5, and 171).   Patient is here today for a follow up. Pt reports doing well, denies SOB, cough or CP.

## 2024-09-26 NOTE — CONSULT LETTER
[Dear  ___] : Dear  [unfilled], [Consult Letter:] : I had the pleasure of evaluating your patient, [unfilled]. [( Thank you for referring [unfilled] for consultation for _____ )] : Thank you for referring [unfilled] for consultation for [unfilled] [Please see my note below.] : Please see my note below. [Consult Closing:] : Thank you very much for allowing me to participate in the care of this patient.  If you have any questions, please do not hesitate to contact me. [Sincerely,] : Sincerely, [DrMarci  ___] : Dr. GARSIA [FreeTextEntry2] : Esteban Vázquez MD (Pul/Referring)\par  Ashlyn Moore MD (PCP) \par   \par   [FreeTextEntry3] : Louie Ibarra MD, MPH \par  System Director of Thoracic Surgery \par  Director of Comprehensive Lung and Foregut [place] \par  Professor Cardiovascular & Thoracic Surgery  \par  [facility] [employer] [facility] at [facility]\par  \par  [facility]\par  [address]\par  Oncology [address]\[place] [address]\par  Tel: [phone]\par  Fax: [phone]\par

## 2024-09-26 NOTE — ASSESSMENT
[FreeTextEntry1] : 63 y/o M, former smoker, w/ hx of asthma, COPD, and lung CA. FNA of RUL nodule on 4/22/19 at [facility]/. Path revealed AdenoCA. +TTF-1 and cytokeratin; Ki-67 5%.  Now s/p Rt VATS Robotic-assisted, RULobectomy, MLND on 6/10/19. Path revealed AdenoCA, acinar (90%) and micropapillary (10%), 1.6 x 1.2cm, G2, all margins and (0/23) LNs negative, pT1bN0 Stg IA2.  I have reviewed the patient's medical records and diagnostic images at time of this office consultation and have made the following recommendation: 1. CT reviewed by Dr. Louie Ibarra, cora fernandez. RTC in 1 yr with CT Chest w/o contrast.   I, Dr. IBARRA, LOUIE CHRISTIANSON, personally performed the evaluation and management (E/M) services for this established patient who presents today with (a) new problem(s)/exacerbation of (an) existing condition(s).  That E/M includes conducting the examination, assessing all new/exacerbated conditions, and establishing a new plan of care.  Today, my ACP, Norma Elliott, FNP-BC was here to observe my evaluation and management services for this new problem/exacerbated condition to be followed going forward.

## 2024-09-26 NOTE — HISTORY OF PRESENT ILLNESS
[FreeTextEntry1] : 63 y/o M, former smoker, w/ hx of asthma, COPD, and lung CA. FNA of RUL nodule on 4/22/19 at [facility]/Q. Path revealed AdenoCA. +TTF-1 and cytokeratin; Ki-67 5%.  Now 5 yrs 3 mo s/p Rt VATS Robotic-assisted, RULobectomy, MLND on 6/10/19. Path revealed AdenoCA, acinar (90%) and micropapillary (10%), 1.6 x 1.2cm, G2, all margins and (0/23) LNs negative, pT1bN0 Stg IA2.  CT Chest on 6/8/21: - post op change - No evidence of local tumor recurrence or metastatic disease within the chest - minimal new tree-in-bud nodularity within the anterior RLL (3: 56), likely postinflammatory - stable 4 mm LLL ggo (3:58), likely benign - stable emphysema  CT Chest on 6/14/22: - post-op changes - two new ill-defined nodular opacities w/in the RLL measuring 1cm and 0.8cm (3:78 & 79) - stable LLL ggos measuring up to 4mm (3:64)  Pt was given a course of Augmentin.   CT Chest on 9/18/22: - post-op changes - resolved ill-defined nodular opacity in RLL  - stable 3 mm ggo in LLL posteriorly (4:115) - stable 4 mm ggo in LLL anteriorly (4:133)  [place]17[place] [facility] [place] - stable post-op changes - no focal site of pulmonary airspace consolidation. There is persistent minimal pulmonary emphysema with an upper lung distribution. - Multiple solid and sub-solid pulmonary nodules measuring up to 5 mm in size are unchanged since September 18, 2022; as two selected examples of many, there is an unchanged 5 mm left lower lobe sub-solid pulmonary nodule (series 5, image 179) and an unchanged 4 mm left lower lobe sub-solid pulmonary nodule (series 5, image 153).  - There is a persistent trace right pleural effusion, as on prior imaging. There is no left pleural effusion or pneumothorax on either side.  CT Chest on 09/24/2024: - Status post right upper lobectomy with stable postoperative changes in the right lung including mild scarring and volume loss. Diffuse bronchial/bronchiolar wall thickening and stable mild multifocal bronchiectasis. Stable minimal upper lobe predominant emphysema.  - Stable solid and subsolid pulmonary nodules measuring up to 5 mm in size, with 2 example nodules including a stable 5 mm left lower lobe subsolid nodule (series 5, image 196) and stable 4 mm left lower lobe subsolid nodule (series 5, and 171).   Patient is here today for a follow up. Pt reports doing well, denies SOB, cough or CP.

## 2024-09-26 NOTE — CONSULT LETTER
[Dear  ___] : Dear  [unfilled], [Consult Letter:] : I had the pleasure of evaluating your patient, [unfilled]. [( Thank you for referring [unfilled] for consultation for _____ )] : Thank you for referring [unfilled] for consultation for [unfilled] [Please see my note below.] : Please see my note below. [Consult Closing:] : Thank you very much for allowing me to participate in the care of this patient.  If you have any questions, please do not hesitate to contact me. [Sincerely,] : Sincerely, [DrMarci  ___] : Dr. GARSIA [FreeTextEntry2] : Esteban Vázquez MD (Pul/Referring)\par  Ashlyn Moore MD (PCP) \par   \par   [FreeTextEntry3] : Louie Ibarra MD, MPH \par  System Director of Thoracic Surgery \par  Director of Comprehensive Lung and Foregut [place] \par  Professor Cardiovascular & Thoracic Surgery  \par  [facility] at [facility]\par  \par  [facility]\par  [address]\par  Oncology [address]\[place] [address]\par  Tel: [phone]\par  Fax: [phone]\par

## 2024-09-26 NOTE — HISTORY OF PRESENT ILLNESS
[FreeTextEntry1] : 63 y/o M, former smoker, w/ hx of asthma, COPD, and lung CA. FNA of RUL nodule on 4/22/19 at [facility]/Q. Path revealed AdenoCA. +TTF-1 and cytokeratin; Ki-67 5%.  Now 5 yrs 3 mo s/p Rt VATS Robotic-assisted, RULobectomy, MLND on 6/10/19. Path revealed AdenoCA, acinar (90%) and micropapillary (10%), 1.6 x 1.2cm, G2, all margins and (0/23) LNs negative, pT1bN0 Stg IA2.  CT Chest on 6/8/21: - post op change - No evidence of local tumor recurrence or metastatic disease within the chest - minimal new tree-in-bud nodularity within the anterior RLL (3: 56), likely postinflammatory - stable 4 mm LLL ggo (3:58), likely benign - stable emphysema  CT Chest on 6/14/22: - post-op changes - two new ill-defined nodular opacities w/in the RLL measuring 1cm and 0.8cm (3:78 & 79) - stable LLL ggos measuring up to 4mm (3:64)  Pt was given a course of Augmentin.   CT Chest on 9/18/22: - post-op changes - resolved ill-defined nodular opacity in RLL  - stable 3 mm ggo in LLL posteriorly (4:115) - stable 4 mm ggo in LLL anteriorly (4:133)  [place] [facility] [place] post-op changes - no focal site of pulmonary airspace consolidation. There is persistent minimal pulmonary emphysema with an upper lung distribution. - Multiple solid and sub-solid pulmonary nodules measuring up to 5 mm in size are unchanged since September 18, 2022; as two selected examples of many, there is an unchanged 5 mm left lower lobe sub-solid pulmonary nodule (series 5, image 179) and an unchanged 4 mm left lower lobe sub-solid pulmonary nodule (series 5, image 153).  - There is a persistent trace right pleural effusion, as on prior imaging. There is no left pleural effusion or pneumothorax on either side.  CT Chest on 09/24/2024: - Status post right upper lobectomy with stable postoperative changes in the right lung including mild scarring and volume loss. Diffuse bronchial/bronchiolar wall thickening and stable mild multifocal bronchiectasis. Stable minimal upper lobe predominant emphysema.  - Stable solid and subsolid pulmonary nodules measuring up to 5 mm in size, with 2 example nodules including a stable 5 mm left lower lobe subsolid nodule (series 5, image 196) and stable 4 mm left lower lobe subsolid nodule (series 5, and 171).   Patient is here today for a follow up. Pt reports doing well, denies SOB, cough or CP.

## 2024-09-26 NOTE — ASSESSMENT
[FreeTextEntry1] : 65 y/o M, former smoker, w/ hx of asthma, COPD, and lung CA. FNA of RUL nodule on 4/22/19 at [facility]/. Path revealed AdenoCA. +TTF-1 and cytokeratin; Ki-67 5%.  Now s/p Rt VATS Robotic-assisted, RULobectomy, MLND on 6/10/19. Path revealed AdenoCA, acinar (90%) and micropapillary (10%), 1.6 x 1.2cm, G2, all margins and (0/23) LNs negative, pT1bN0 Stg IA2.  I have reviewed the patient's medical records and diagnostic images at time of this office consultation and have made the following recommendation: 1. CT reviewed by Dr. Louie Ibarra, cora fernandez. RTC in 1 yr with CT Chest w/o contrast.   I, Dr. IBARRA, LOUIE CHRISTIANSON, personally performed the evaluation and management (E/M) services for this established patient who presents today with (a) new problem(s)/exacerbation of (an) existing condition(s).  That E/M includes conducting the examination, assessing all new/exacerbated conditions, and establishing a new plan of care.  Today, my ACP, Norma Elliott, FNP-BC was here to observe my evaluation and management services for this new problem/exacerbated condition to be followed going forward.  Name band;

## 2024-09-26 NOTE — HISTORY OF PRESENT ILLNESS
OFFICE VISIT      Patient: Silas Hernandez   : 1943 MRN: [id number]    SUBJECTIVE:  Chief Complaint   Patient presents with   • Follow-up   • Results     CT       A 80 year old male is here for follow up visit.    Accompanied by his wife.     Patient has given consent to record this visit for documentation in their clinical record.     HISTORY OF PRESENT ILLNESS:    Left hip pain / Sacroiliac joint pain :  The pain has mildly improved. He had stat CT which did not display any psoas abscess, but did display a 4 cm infrarenal aneurysm. States it hurts again mostly when he walks. Requested for pain medication.     Infrarenal abdominal aortic aneurysm (AAA) without rupture (CMD) :  Recent imaging showed infrarenal aneurysm. Inquires about aneurysm.     Need for influenza vaccination :  Due for vaccine.     Hx of CABG :  Denies any symptoms.     Paroxysmal atrial fibrillation (CMD) :  Reports no issues.     SSS (sick sinus syndrome) (CMD) /Status post placement of cardiac pacemaker :  He has been consulting cardiologist.       PAST MEDICAL HISTORY:  Past Medical History:   Diagnosis Date   • Allergy    • Arthritis    • CAD (coronary artery disease)    • CHB (complete heart block) (CMD)    • CKD (chronic kidney disease)    • COVID-19    • Diastolic heart failure (CMD)    • Essential (primary) hypertension    • Fracture    • Gastroesophageal reflux disease    • Gout    • Obesity (BMI 30-39.9)    • Psoriasis    • Thyroid condition      MEDICATIONS:  Current Outpatient Medications   Medication Sig   • predniSONE (DELTASONE) 10 MG tablet Take 4 po x 1 day then 3 po x 2 days then 2 po x 2 days then 1 po x 2 days then half tab po x 4 days   • tramadol-acetaminophen (ULTRACET) 37.5-325 MG per tablet Take 1 tablet by mouth every 6 hours as needed for Pain.   • diphenhydrAMINE (BENADRYL) 50 MG tablet Take one at bedtime tonight and one at 7 am tomorrow   • olopatadine (PATANOL) 0.1 % ophthalmic solution PLACE 1 DROP INTO  BOTH EYES 2 TIMES DAILY AS NEEDED FOR ALLERGIES.   • pantoprazole (PROTONIX) 40 MG tablet TAKE 1 TABLET BY MOUTH EVERYDAY AT BEDTIME   • allopurinol (ZYLOPRIM) 100 MG tablet TAKE 2 TABLETS BY MOUTH EVERY DAY   • atorvastatin (LIPITOR) 20 MG tablet Take 1 tablet by mouth nightly.   • fluocinonide (LIDEX) 0.05 % topical solution Apply to scalp and outer ears bid   • ketoconazole (NIZORAL) 2 % shampoo Apply to scalp and outer ears qhs   • calcipotriene (DOVONEX) 0.005 % cream Apply to psoriasis lesions on neck, elbow, and knees bid   • ferrous sulfate 325 (65 FE) MG tablet TAKE 1 TABLET BY MOUTH EVERY DAY WITH BREAKFAST   • levothyroxine 25 MCG tablet Take 1 tablet by mouth daily. COURTESY FILL NEEDS LABS DRAWN AT UPCOMING VISIT   • metoPROLOL tartrate (LOPRESSOR) 25 MG tablet TAKE 1 TABLET BY MOUTH EVERY 12 HOURS   • montelukast (SINGULAIR) 10 MG tablet TAKE 1 TABLET BY MOUTH EVERY DAY   • doxazosin (CARDURA) 8 MG tablet TAKE 1 TABLET BY MOUTH EVERY DAY AT NIGHT   • furosemide (LASIX) 40 MG tablet Take 1 tablet by mouth daily.   • plecanatide (TRULANCE) 3 MG tablet Take 1 tablet by mouth daily.   • apixaBAN (ELIQUIS) 5 MG Tab Take 1 tablet by mouth every 12 hours.   • aspirin 81 MG chewable tablet Chew 1 tablet by mouth daily.   • fexofenadine (Allegra Allergy) 60 MG tablet Take 1 tablet by mouth daily.     No current facility-administered medications for this visit.     ALLERGIES:  ALLERGIES:   Allergen Reactions   • Iodine HIVES, Other (See Comments) and RASH   • Contrast Media HIVES     Pt had hives when receiving a CT scan after dye was given   • Iodinated Diagnostic Agents HIVES     Pt had hives when receiving a CT scan after dye was given   • Iodine   (Environmental Or Med) HIVES     PAST SURGICAL HISTORY:  Past Surgical History:   Procedure Laterality Date   • Coronary artery bypass graft  05/07/2021   • Fracture surgery     • Pacemaker  05/13/2021   • Prostate surgery       FAMILY HISTORY:  Family History    Problem Relation Age of Onset   • Diabetes Mother    • Patient is unaware of any medical problems Father    • Osteoarthritis Sister    • Osteoarthritis Sister    • Cancer Daughter    • Patient is unaware of any medical problems Daughter    • Sleep Apnea Son      SOCIAL HISTORY:  Social History     Tobacco Use   Smoking Status Never   Smokeless Tobacco Never     Social History     Substance and Sexual Activity   Alcohol Use Never       Review of Systems    Constitutional: Negative for activity change, appetite change, chills, diaphoresis, fatigue, fever and unexpected weight change.   HENT: Negative for congestion, ear discharge, ear pain, hearing loss, mouth sores, postnasal drip, sinus pressure, sinus pain, sore throat, trouble swallowing and voice change.   Eyes: Negative for photophobia, pain, discharge, redness, itching and visual disturbance.   Respiratory: Negative for cough, chest tightness, shortness of breath and wheezing.   Cardiovascular: Negative for chest pain, palpitations and leg swelling.   Gastrointestinal: Negative for abdominal pain, blood in stool, constipation, diarrhea, nausea and vomiting.   Endocrine: Negative for cold intolerance, heat intolerance, polydipsia, polyphagia and polyuria.   Genitourinary: Negative for decreased urine volume, difficulty urinating, dysuria, flank pain, frequency, hematuria and urgency.   Musculoskeletal: per HPI.    Skin: Negative for rash and wound.   Allergic/Immunologic: Negative for environmental allergies.   Neurological: Negative for dizziness, tremors, syncope, weakness, light-headedness, numbness and headaches.   Hematological: Negative for adenopathy. Does not bruise/bleed easily.   Psychiatric/Behavioral: Negative for decreased concentration, dysphoric mood, hallucinations, self-injury, sleep disturbance and suicidal ideas. The patient is not nervous/anxious.    OBJECTIVE:  Vitals:    09/15/23 0920   BP: 135/78   Pulse: 64   Resp: 18   Temp: 98.4 °F  (36.9 °C)   SpO2: 98%   Weight: 110.7 kg (244 lb)   Height: 5' 8\"       Physical Exam    Constitutional: Obese,  male. Alert, in no acute distress, and current vital signs reviewed.  Head and Face: Atraumatic, no deformities, normocephalic, and normal facies.  Eyes: No discharge, normal conjunctiva, no eyelid swelling, no ptosis, and the sclerae were normal. Pupils equal, round and reactive to light and accommodation, conjugate gaze, and extraocular movements were intact.  ENT: Normal appearing outer ear, normal appearing nose. Examination of the tympanic membrane showed normal landmarks, normal appearing external canal. Nasal mucosa moist and pink, no nasal discharge. Normal lips. Oral mucosa pink and moist, no oral lesions.  Neck: Normal appearing neck, supple neck, and no mass was seen. Thyroid not enlarged and no thyroid nodules.  Pulmonary: No respiratory distress, normal respiratory rate and effort, and no accessory muscle use. Breath sounds clear to auscultation bilaterally.  Cardiovascular: Normal rate,  +2/6 systolic ejection murmur  , regular rhythm, irregular S1, and S2. Edema was not present in the lower extremities.  Abdomen: Soft, non-tender, non-distended, normal bowel sounds, and no abdominal mass. No hepatomegaly and no splenomegaly. No umbilical hernia was discovered.  Musculoskeletal: tenderness upon left hip flexion. Antalgic gait. No musculoskeletal erythema was seen, no joint swelling seen. No scoliosis. Normal range of motion. There was no joint instability noted. Muscle strength and tone were normal.  Neurologic: Cranial nerves grossly intact. Normal DTRs. No sensory deficits noted. No coordination deficits. Normal gait. Muscle strength and tone were normal.  Psychiatric: Oriented to person, oriented to place, and oriented to time. Alert and awake, interactive and mood/affect were appropriate. Judgement not impaired. Normal attention span. Short term memory intact.  Skin, Hair,  Nails: Normal skin color and pigmentation and no rash. No foot ulcers and no skin ulcer was seen. Normal skin turgor. No clubbing or cyanosis of the fingernails.    DIAGNOSTIC STUDIES:  LAB RESULTS:  Lab Services on 09/13/2023   Component Date Value Ref Range Status   • Sodium 09/13/2023 141  135 - 145 mmol/L Final   • Potassium 09/13/2023 4.1  3.4 - 5.1 mmol/L Final   • Chloride 09/13/2023 105  97 - 110 mmol/L Final   • Carbon Dioxide 09/13/2023 26  21 - 32 mmol/L Final   • Anion Gap 09/13/2023 14  7 - 19 mmol/L Final   • Glucose 09/13/2023 104 (H)  70 - 99 mg/dL Final   • BUN 09/13/2023 37 (H)  6 - 20 mg/dL Final   • Creatinine 09/13/2023 1.35 (H)  0.67 - 1.17 mg/dL Final   • Glomerular Filtration Rate 09/13/2023 53 (L)  >=60 Final   • BUN/Cr 09/13/2023 27 (H)  7 - 25 Final   • Calcium 09/13/2023 8.8  8.4 - 10.2 mg/dL Final   • WBC 09/13/2023 9.1  4.2 - 11.0 K/mcL Final   • RBC 09/13/2023 4.53  4.50 - 5.90 mil/mcL Final   • HGB 09/13/2023 13.8  13.0 - 17.0 g/dL Final   • HCT 09/13/2023 42.0  39.0 - 51.0 % Final   • MCV 09/13/2023 92.7  78.0 - 100.0 fl Final   • MCH 09/13/2023 30.5  26.0 - 34.0 pg Final   • MCHC 09/13/2023 32.9  32.0 - 36.5 g/dL Final   • RDW-CV 09/13/2023 15.7 (H)  11.0 - 15.0 % Final   • RDW-SD 09/13/2023 51.3 (H)  39.0 - 50.0 fL Final   • PLT 09/13/2023 168  140 - 450 K/mcL Final   • NRBC 09/13/2023 0  <=0 /100 WBC Final   • Neutrophil, Percent 09/13/2023 83  % Final   • Lymphocytes, Percent 09/13/2023 10  % Final   • Mono, Percent 09/13/2023 6  % Final   • Eosinophils, Percent 09/13/2023 0  % Final   • Basophils, Percent 09/13/2023 0  % Final   • Immature Granulocytes 09/13/2023 1  % Final   • Absolute Neutrophils 09/13/2023 7.4  1.8 - 7.7 K/mcL Final   • Absolute Lymphocytes 09/13/2023 0.9 (L)  1.0 - 4.0 K/mcL Final   • Absolute Monocytes 09/13/2023 0.5  0.3 - 0.9 K/mcL Final   • Absolute Eosinophils  09/13/2023 0.0  0.0 - 0.5 K/mcL Final   • Absolute Basophils 09/13/2023 0.0  0.0 - 0.3 K/mcL  Final   • Absolute Immature Granulocytes 09/13/2023 0.1  0.0 - 0.2 K/mcL Final       ASSESSMENT AND PLAN:  This is a 80 year old male who presents with :  1. Left hip pain    2. Infrarenal abdominal aortic aneurysm (AAA) without rupture (CMD)    3. Sacroiliac joint pain    4. Need for influenza vaccination    5. Hx of CABG    6. Paroxysmal atrial fibrillation (CMD)    7. SSS (sick sinus syndrome) (CMD)    8. Status post placement of cardiac pacemaker    9. Obesity (BMI 30-39.9)        Orders Placed This Encounter   • INFLUENZA QUADRIVALENT HIGH DOSE PRES FREE 0.7 ML VACC,IM   • DISCONTD: tramadol-acetaminophen (ULTRACET) 37.5-325 MG per tablet   • predniSONE (DELTASONE) 10 MG tablet   • tramadol-acetaminophen (ULTRACET) 37.5-325 MG per tablet   • US VASC AORTA DUPLEX       Plan:    Left hip pain / Sacroiliac joint pain :  I explained to the patient that there is a small possibility that he could have a subtle acetabular fracture that is not visible on his x-ray.   He however is getting somewhat better.   Patient will finish his prednisone course.  It was redefined for him and re prescribed.  He will also take Ultracet for pain.  He will follow-up with me in 2 weeks or sooner if worse.  Patient and his wife express verbal understanding.    Infrarenal abdominal aortic aneurysm (AAA) without rupture (CMD) :  Patient and wife were educated at length regarding this.  Repeat ultrasound in 6 months.  Importance of control of blood pressure and risk factors discussed.  Ordered US VASC AORTA DUPLEX. Refer orders.    Need for influenza vaccination :  Administered INFLUENZA QUADRIVALENT HIGH DOSE PRES FREE 0.7 ML VACC,IM in today's visit.     Hx of CABG :  patient remains asymptomatic.  Continue medication.    Paroxysmal atrial fibrillation (CMD) :  Rate is controlled.  Continue thromboembolic prophylaxis.    SSS (sick sinus syndrome) (CMD) /Status post placement of cardiac pacemaker :  Continue current treatment.  Follow-up  with cardiologist.    Obesity (BMI 30-39.9) :  Encouraged weight loss.     Follow up in 3 months or sooner if needed.    Refer to orders.  Medical compliance with plan discussed and risks of non-compliance reviewed.  Patient education completed on disease process, etiology & prognosis.  Proper usage and side effects of medications reviewed & discussed.  Patient understands and agrees with the plan.  Return to clinic as clinically indicated as discussed with patient who verbalized understanding of the plan and is in agreement with the plan.    I,  Chanel Cortez, have created a visit summary document based on the audio recording between Dr. Matt Worley DO and this patient for the physician to review, edit as needed, and authenticate.  Creation Date: 9/15/2023   I have reviewed and edited the visit summary above and attest that it is accurate.         [FreeTextEntry1] : 63 y/o M, former smoker, w/ hx of asthma, COPD, and lung CA. FNA of RUL nodule on 4/22/19 at [facility]/Q. Path revealed AdenoCA. +TTF-1 and cytokeratin; Ki-67 5%.  Now 5 yrs 3 mo s/p Rt VATS Robotic-assisted, RULobectomy, MLND on 6/10/19. Path revealed AdenoCA, acinar (90%) and micropapillary (10%), 1.6 x 1.2cm, G2, all margins and (0/23) LNs negative, pT1bN0 Stg IA2.  CT Chest on 6/8/21: - post op change - No evidence of local tumor recurrence or metastatic disease within the chest - minimal new tree-in-bud nodularity within the anterior RLL (3: 56), likely postinflammatory - stable 4 mm LLL ggo (3:58), likely benign - stable emphysema  CT Chest on 6/14/22: - post-op changes - two new ill-defined nodular opacities w/in the RLL measuring 1cm and 0.8cm (3:78 & 79) - stable LLL ggos measuring up to 4mm (3:64)  Pt was given a course of Augmentin.   CT Chest on 9/18/22: - post-op changes - resolved ill-defined nodular opacity in RLL  - stable 3 mm ggo in LLL posteriorly (4:115) - stable 4 mm ggo in LLL anteriorly (4:133)  CT Chest on 9/17/23 at [facility]: - stable post-op changes - no focal site of pulmonary airspace consolidation. There is persistent minimal pulmonary emphysema with an upper lung distribution. - Multiple solid and sub-solid pulmonary nodules measuring up to 5 mm in size are unchanged since September 18, 2022; as two selected examples of many, there is an unchanged 5 mm left lower lobe sub-solid pulmonary nodule (series 5, image 179) and an unchanged 4 mm left lower lobe sub-solid pulmonary nodule (series 5, image 153).  - There is a persistent trace right pleural effusion, as on prior imaging. There is no left pleural effusion or pneumothorax on either side.  CT Chest on 09/24/2024: - Status post right upper lobectomy with stable postoperative changes in the right lung including mild scarring and volume loss. Diffuse bronchial/bronchiolar wall thickening and stable mild multifocal bronchiectasis. Stable minimal upper lobe predominant emphysema.  - Stable solid and subsolid pulmonary nodules measuring up to 5 mm in size, with 2 example nodules including a stable 5 mm left lower lobe subsolid nodule (series 5, image 196) and stable 4 mm left lower lobe subsolid nodule (series 5, and 171).   Patient is here today for a follow up. Pt reports doing well, denies SOB, cough or CP.

## 2024-09-26 NOTE — CONSULT LETTER
[Dear  ___] : Dear  [unfilled], [Consult Letter:] : I had the pleasure of evaluating your patient, [unfilled]. [Please see my note below.] : Please see my note below. [( Thank you for referring [unfilled] for consultation for _____ )] : Thank you for referring [unfilled] for consultation for [unfilled] [Consult Closing:] : Thank you very much for allowing me to participate in the care of this patient.  If you have any questions, please do not hesitate to contact me. [Sincerely,] : Sincerely, [DrMarci  ___] : Dr. GARSIA [FreeTextEntry2] : Esteban Vázquez MD (Pul/Referring)\par  Ashlyn Moore MD (PCP) \par   \par   [FreeTextEntry3] : Louie Ibarra MD, MPH \par  System Director of Thoracic Surgery \par  Director of Comprehensive Lung and Foregut [place] \par  Professor Cardiovascular & Thoracic Surgery  \par  [facility] [employer] [facility]\par  \par  [facility]\par  [address]\par  Oncology [address]\[place] [address]\par  Tel: [phone]\par  Fax: [phone]\par

## 2024-09-26 NOTE — CONSULT LETTER
[Dear  ___] : Dear  [unfilled], [Consult Letter:] : I had the pleasure of evaluating your patient, [unfilled]. [Please see my note below.] : Please see my note below. [( Thank you for referring [unfilled] for consultation for _____ )] : Thank you for referring [unfilled] for consultation for [unfilled] [Consult Closing:] : Thank you very much for allowing me to participate in the care of this patient.  If you have any questions, please do not hesitate to contact me. [Sincerely,] : Sincerely, [DrMarci  ___] : Dr. GARSIA [FreeTextEntry2] : Esteban Vázquez MD (Pul/Referring)\par  Ashlyn Moore MD (PCP) \par   \par   [FreeTextEntry3] : Louie Ibarra MD, MPH \par  System Director of Thoracic Surgery \par  Director of Comprehensive Lung and Foregut [place] \par  Professor Cardiovascular & Thoracic Surgery  \par  [facility] [employer] [facility]\par  \par  [facility]\par  [address]\par  Oncology [address]\[place] [address]\par  Tel: [phone]\par  Fax: [phone]\par

## 2024-09-26 NOTE — ASSESSMENT
[FreeTextEntry1] : 65 y/o M, former smoker, w/ hx of asthma, COPD, and lung CA. FNA of RUL nodule on 4/22/19 at [facility]/. Path revealed AdenoCA. +TTF-1 and cytokeratin; Ki-67 5%.  Now s/p Rt VATS Robotic-assisted, RULobectomy, MLND on 6/10/19. Path revealed AdenoCA, acinar (90%) and micropapillary (10%), 1.6 x 1.2cm, G2, all margins and (0/23) LNs negative, pT1bN0 Stg IA2.  I have reviewed the patient's medical records and diagnostic images at time of this office consultation and have made the following recommendation: 1. CT reviewed by Dr. Louie Ibarra, cora fernandez. RTC in 1 yr with CT Chest w/o contrast.   I, Dr. IBARRA, LOUIE CHRISTIANSON, personally performed the evaluation and management (E/M) services for this established patient who presents today with (a) new problem(s)/exacerbation of (an) existing condition(s).  That E/M includes conducting the examination, assessing all new/exacerbated conditions, and establishing a new plan of care.  Today, my ACP, Norma Elliott, FNP-BC was here to observe my evaluation and management services for this new problem/exacerbated condition to be followed going forward.

## 2024-09-26 NOTE — ASSESSMENT
[FreeTextEntry1] : 65 y/o M, former smoker, w/ hx of asthma, COPD, and lung CA. FNA of RUL nodule on 4/22/19 at [facility]/. Path revealed AdenoCA. +TTF-1 and cytokeratin; Ki-67 5%.  Now s/p Rt VATS Robotic-assisted, RULobectomy, MLND on 6/10/19. Path revealed AdenoCA, acinar (90%) and micropapillary (10%), 1.6 x 1.2cm, G2, all margins and (0/23) LNs negative, pT1bN0 Stg IA2.  I have reviewed the patient's medical records and diagnostic images at time of this office consultation and have made the following recommendation: 1. CT reviewed by Dr. Louie Ibarra, cora fernandez. RTC in 1 yr with CT Chest w/o contrast.   I, Dr. IBARRA, LOUIE CHRISTIANSON, personally performed the evaluation and management (E/M) services for this established patient who presents today with (a) new problem(s)/exacerbation of (an) existing condition(s).  That E/M includes conducting the examination, assessing all new/exacerbated conditions, and establishing a new plan of care.  Today, my ACP, Norma Elliott, FNP-BC was here to observe my evaluation and management services for this new problem/exacerbated condition to be followed going forward.

## 2024-10-14 NOTE — H&P PST ADULT - LYMPHATICS COMMENTS
Refer to the Assessment tab to view/cancel completed assessment.
No anterior cervical lymphadenopathy